# Patient Record
Sex: MALE | Race: BLACK OR AFRICAN AMERICAN | NOT HISPANIC OR LATINO | Employment: OTHER | URBAN - NONMETROPOLITAN AREA
[De-identification: names, ages, dates, MRNs, and addresses within clinical notes are randomized per-mention and may not be internally consistent; named-entity substitution may affect disease eponyms.]

---

## 2017-01-01 ENCOUNTER — LAB (OUTPATIENT)
Dept: LAB | Facility: HOSPITAL | Age: 82
End: 2017-01-01

## 2017-01-01 ENCOUNTER — OFFICE VISIT (OUTPATIENT)
Dept: FAMILY MEDICINE CLINIC | Facility: CLINIC | Age: 82
End: 2017-01-01

## 2017-01-01 ENCOUNTER — TELEPHONE (OUTPATIENT)
Dept: FAMILY MEDICINE CLINIC | Facility: CLINIC | Age: 82
End: 2017-01-01

## 2017-01-01 ENCOUNTER — OFFICE VISIT (OUTPATIENT)
Dept: CARDIOLOGY | Facility: CLINIC | Age: 82
End: 2017-01-01

## 2017-01-01 VITALS
SYSTOLIC BLOOD PRESSURE: 110 MMHG | WEIGHT: 176 LBS | HEART RATE: 68 BPM | HEIGHT: 67 IN | BODY MASS INDEX: 27.62 KG/M2 | DIASTOLIC BLOOD PRESSURE: 70 MMHG

## 2017-01-01 VITALS
HEART RATE: 65 BPM | HEIGHT: 67 IN | WEIGHT: 174.3 LBS | OXYGEN SATURATION: 98 % | SYSTOLIC BLOOD PRESSURE: 148 MMHG | BODY MASS INDEX: 27.36 KG/M2 | DIASTOLIC BLOOD PRESSURE: 65 MMHG

## 2017-01-01 DIAGNOSIS — I48.20 CHRONIC ATRIAL FIBRILLATION (HCC): ICD-10-CM

## 2017-01-01 DIAGNOSIS — I10 ESSENTIAL HYPERTENSION: ICD-10-CM

## 2017-01-01 DIAGNOSIS — R82.90 ABNORMAL URINE FINDINGS: Primary | ICD-10-CM

## 2017-01-01 DIAGNOSIS — Z00.00 MEDICARE ANNUAL WELLNESS VISIT, INITIAL: Primary | ICD-10-CM

## 2017-01-01 DIAGNOSIS — I36.1 NON-RHEUMATIC TRICUSPID VALVE INSUFFICIENCY: ICD-10-CM

## 2017-01-01 DIAGNOSIS — D64.9 ANEMIA, UNSPECIFIED TYPE: ICD-10-CM

## 2017-01-01 DIAGNOSIS — E55.9 VITAMIN D DEFICIENCY: ICD-10-CM

## 2017-01-01 DIAGNOSIS — I50.30 HEART FAILURE WITH PRESERVED EJECTION FRACTION, BORDERLINE, CLASS II (HCC): ICD-10-CM

## 2017-01-01 DIAGNOSIS — I25.10 ATHEROSCLEROSIS OF NATIVE CORONARY ARTERY OF NATIVE HEART WITHOUT ANGINA PECTORIS: ICD-10-CM

## 2017-01-01 DIAGNOSIS — Z23 NEED FOR IMMUNIZATION AGAINST INFLUENZA: ICD-10-CM

## 2017-01-01 DIAGNOSIS — R82.90 ABNORMAL URINE FINDINGS: ICD-10-CM

## 2017-01-01 DIAGNOSIS — I10 ESSENTIAL HYPERTENSION: Primary | ICD-10-CM

## 2017-01-01 LAB
25(OH)D3 SERPL-MCNC: 55.7 NG/ML (ref 30–100)
ALBUMIN SERPL-MCNC: 4.5 G/DL (ref 3.4–4.8)
ALBUMIN/GLOB SERPL: 1.2 G/DL (ref 1.1–1.8)
ALP SERPL-CCNC: 107 U/L (ref 38–126)
ALT SERPL W P-5'-P-CCNC: 24 U/L (ref 21–72)
ANION GAP SERPL CALCULATED.3IONS-SCNC: 13 MMOL/L (ref 5–15)
AST SERPL-CCNC: 38 U/L (ref 17–59)
BACTERIA SPEC AEROBE CULT: ABNORMAL
BACTERIA SPEC AEROBE CULT: ABNORMAL
BACTERIA UR QL AUTO: ABNORMAL /HPF
BASOPHILS # BLD AUTO: 0.05 10*3/MM3 (ref 0–0.2)
BASOPHILS NFR BLD AUTO: 0.6 % (ref 0–2)
BILIRUB SERPL-MCNC: 1 MG/DL (ref 0.2–1.3)
BILIRUB UR QL STRIP: NEGATIVE
BUN BLD-MCNC: 27 MG/DL (ref 7–21)
BUN/CREAT SERPL: 16 (ref 7–25)
CALCIUM SPEC-SCNC: 9.6 MG/DL (ref 8.4–10.2)
CHLORIDE SERPL-SCNC: 100 MMOL/L (ref 95–110)
CLARITY UR: CLEAR
CO2 SERPL-SCNC: 29 MMOL/L (ref 22–31)
COLOR UR: YELLOW
CREAT BLD-MCNC: 1.69 MG/DL (ref 0.7–1.3)
DEPRECATED RDW RBC AUTO: 45.7 FL (ref 35.1–43.9)
EOSINOPHIL # BLD AUTO: 1.51 10*3/MM3 (ref 0–0.7)
EOSINOPHIL NFR BLD AUTO: 17.7 % (ref 0–7)
ERYTHROCYTE [DISTWIDTH] IN BLOOD BY AUTOMATED COUNT: 13.1 % (ref 11.5–14.5)
GFR SERPL CREATININE-BSD FRML MDRD: 47 ML/MIN/1.73 (ref 42–98)
GLOBULIN UR ELPH-MCNC: 3.7 GM/DL (ref 2.3–3.5)
GLUCOSE BLD-MCNC: 102 MG/DL (ref 60–100)
GLUCOSE UR STRIP-MCNC: NEGATIVE MG/DL
HCT VFR BLD AUTO: 38.1 % (ref 39–49)
HGB BLD-MCNC: 12.6 G/DL (ref 13.7–17.3)
HGB UR QL STRIP.AUTO: NEGATIVE
HYALINE CASTS UR QL AUTO: ABNORMAL /LPF
IMM GRANULOCYTES # BLD: 0.03 10*3/MM3 (ref 0–0.02)
IMM GRANULOCYTES NFR BLD: 0.4 % (ref 0–0.5)
KETONES UR QL STRIP: NEGATIVE
LEUKOCYTE ESTERASE UR QL STRIP.AUTO: ABNORMAL
LYMPHOCYTES # BLD AUTO: 4.15 10*3/MM3 (ref 0.6–4.2)
LYMPHOCYTES NFR BLD AUTO: 48.8 % (ref 10–50)
MCH RBC QN AUTO: 31.7 PG (ref 26.5–34)
MCHC RBC AUTO-ENTMCNC: 33.1 G/DL (ref 31.5–36.3)
MCV RBC AUTO: 96 FL (ref 80–98)
MONOCYTES # BLD AUTO: 0.38 10*3/MM3 (ref 0–0.9)
MONOCYTES NFR BLD AUTO: 4.5 % (ref 0–12)
NEUTROPHILS # BLD AUTO: 2.39 10*3/MM3 (ref 2–8.6)
NEUTROPHILS NFR BLD AUTO: 28 % (ref 37–80)
NITRITE UR QL STRIP: NEGATIVE
PH UR STRIP.AUTO: 5.5 [PH] (ref 5–9)
PLATELET # BLD AUTO: 215 10*3/MM3 (ref 150–450)
PMV BLD AUTO: 10.1 FL (ref 8–12)
POTASSIUM BLD-SCNC: 4.2 MMOL/L (ref 3.5–5.1)
PROT SERPL-MCNC: 8.2 G/DL (ref 6.3–8.6)
PROT UR QL STRIP: ABNORMAL
RBC # BLD AUTO: 3.97 10*6/MM3 (ref 4.37–5.74)
RBC # UR: ABNORMAL /HPF
REF LAB TEST METHOD: ABNORMAL
SODIUM BLD-SCNC: 142 MMOL/L (ref 137–145)
SP GR UR STRIP: 1.02 (ref 1–1.03)
SQUAMOUS #/AREA URNS HPF: ABNORMAL /HPF
UROBILINOGEN UR QL STRIP: ABNORMAL
WBC NRBC COR # BLD: 8.51 10*3/MM3 (ref 3.2–9.8)
WBC UR QL AUTO: ABNORMAL /HPF

## 2017-01-01 PROCEDURE — G0438 PPPS, INITIAL VISIT: HCPCS | Performed by: GENERAL PRACTICE

## 2017-01-01 PROCEDURE — 99213 OFFICE O/P EST LOW 20 MIN: CPT | Performed by: INTERNAL MEDICINE

## 2017-01-01 PROCEDURE — 85025 COMPLETE CBC W/AUTO DIFF WBC: CPT | Performed by: GENERAL PRACTICE

## 2017-01-01 PROCEDURE — G0008 ADMIN INFLUENZA VIRUS VAC: HCPCS | Performed by: GENERAL PRACTICE

## 2017-01-01 PROCEDURE — 87086 URINE CULTURE/COLONY COUNT: CPT | Performed by: GENERAL PRACTICE

## 2017-01-01 PROCEDURE — 36415 COLL VENOUS BLD VENIPUNCTURE: CPT

## 2017-01-01 PROCEDURE — 99213 OFFICE O/P EST LOW 20 MIN: CPT | Performed by: GENERAL PRACTICE

## 2017-01-01 PROCEDURE — 82306 VITAMIN D 25 HYDROXY: CPT | Performed by: GENERAL PRACTICE

## 2017-01-01 PROCEDURE — 80053 COMPREHEN METABOLIC PANEL: CPT | Performed by: GENERAL PRACTICE

## 2017-01-01 PROCEDURE — 81001 URINALYSIS AUTO W/SCOPE: CPT | Performed by: GENERAL PRACTICE

## 2017-01-01 PROCEDURE — 90662 IIV NO PRSV INCREASED AG IM: CPT | Performed by: GENERAL PRACTICE

## 2017-01-01 RX ORDER — LOSARTAN POTASSIUM 100 MG/1
TABLET ORAL
Qty: 90 TABLET | Refills: 3 | Status: SHIPPED | OUTPATIENT
Start: 2017-01-01 | End: 2018-01-01

## 2017-01-03 ENCOUNTER — OFFICE VISIT (OUTPATIENT)
Dept: FAMILY MEDICINE CLINIC | Facility: CLINIC | Age: 82
End: 2017-01-03

## 2017-01-03 VITALS
WEIGHT: 182.2 LBS | HEIGHT: 67 IN | DIASTOLIC BLOOD PRESSURE: 80 MMHG | SYSTOLIC BLOOD PRESSURE: 135 MMHG | OXYGEN SATURATION: 97 % | HEART RATE: 63 BPM | BODY MASS INDEX: 28.6 KG/M2

## 2017-01-03 DIAGNOSIS — B35.1 ONYCHOMYCOSIS: ICD-10-CM

## 2017-01-03 DIAGNOSIS — L60.0 INGROWN NAIL: ICD-10-CM

## 2017-01-03 DIAGNOSIS — Z23 NEED FOR INFLUENZA VACCINATION: ICD-10-CM

## 2017-01-03 DIAGNOSIS — I10 ESSENTIAL HYPERTENSION: Primary | Chronic | ICD-10-CM

## 2017-01-03 DIAGNOSIS — M15.9 DEGENERATIVE JOINT DISEASE INVOLVING MULTIPLE JOINTS ON BOTH SIDES OF BODY: Chronic | ICD-10-CM

## 2017-01-03 DIAGNOSIS — D64.9 ANEMIA, UNSPECIFIED TYPE: Chronic | ICD-10-CM

## 2017-01-03 DIAGNOSIS — N28.9 RENAL IMPAIRMENT: Chronic | ICD-10-CM

## 2017-01-03 DIAGNOSIS — Z23 NEED FOR VACCINATION: ICD-10-CM

## 2017-01-03 PROCEDURE — G0008 ADMIN INFLUENZA VIRUS VAC: HCPCS | Performed by: GENERAL PRACTICE

## 2017-01-03 PROCEDURE — 99214 OFFICE O/P EST MOD 30 MIN: CPT | Performed by: GENERAL PRACTICE

## 2017-01-03 NOTE — PROGRESS NOTES
Subjective   Darian Pedroza is a 88 y.o. male.     Chief Complaint   Patient presents with   • Follow-up   • Hypertension     History of Present Illness     For review and evaluation of management of chronic medical problems. Labs reviewed.   Hypertension   This is a chronic problem. The current episode started more than 1 year ago. The problem is unchanged. The problem is controlled. Pertinent negatives include no chest pain, headaches, neck pain, palpitations or shortness of breath. There are no associated agents to hypertension. Past treatments include diuretics and angiotensin blockers. The current treatment provides significant improvement. There are no compliance problems.       2 nights ago had severe pain in right knee, felt like electrical shocks, is better now, thinks might have been weather related. Has swelling right big toe, wondering if gout. Has never had before.  Would like to see a podiatrist for his toenails as they are thick and long and cut into his toes.     The following portions of the patient's history were reviewed and updated as appropriate: allergies, current medications, past social history and problem list.      Current Outpatient Prescriptions:   •  aspirin 81 MG chewable tablet, Chew 81 mg Daily., Disp: , Rfl:   •  atorvastatin (LIPITOR) 40 MG tablet, Take 40 mg by mouth Daily., Disp: , Rfl:   •  B Complex Vitamins (VITAMIN B COMPLEX) capsule capsule, Take 1 capsule by mouth Daily., Disp: , Rfl:   •  bimatoprost (LUMIGAN) 0.01 % ophthalmic drops, 1 drop Every Night., Disp: , Rfl:   •  colchicine 0.6 MG tablet, Take 0.6 mg by mouth Daily., Disp: , Rfl:   •  cyclobenzaprine (FLEXERIL) 5 MG tablet, Take 5 mg by mouth 3 (Three) Times a Day As Needed for muscle spasms., Disp: , Rfl:   •  diltiazem XR (DILT-XR) 120 MG 24 hr capsule, Take 1 capsule by mouth daily., Disp: 90 capsule, Rfl: 2  •  dorzolamide (TRUSOPT) 2 % ophthalmic solution, 1 drop 2 (Two) Times a Day., Disp: , Rfl:   •   "ferrous sulfate 325 (65 FE) MG tablet, Take 1 tablet by mouth 2 (Two) Times a Day., Disp: 180 tablet, Rfl: 1  •  furosemide (LASIX) 40 MG tablet, Take 40 mg by mouth 2 (Two) Times a Day., Disp: , Rfl:   •  losartan (COZAAR) 100 MG tablet, Take 50 mg by mouth Daily., Disp: , Rfl:   •  spironolactone (ALDACTONE) 25 MG tablet, Take 12.5 mg by mouth Daily., Disp: , Rfl:   No current facility-administered medications for this visit.     Review of Systems   Constitutional: Negative.  Negative for activity change, appetite change, chills, fatigue, fever and unexpected weight change.   HENT: Negative.  Negative for congestion, ear pain, hearing loss, nosebleeds, rhinorrhea, sinus pressure, sneezing, sore throat, tinnitus and trouble swallowing.    Eyes: Negative.  Negative for pain, discharge, redness, itching and visual disturbance.   Respiratory: Negative.  Negative for apnea, cough, chest tightness, shortness of breath and wheezing.    Cardiovascular: Negative.  Negative for chest pain, palpitations and leg swelling.   Gastrointestinal: Negative.  Negative for abdominal distention, abdominal pain, constipation, diarrhea, nausea and vomiting.   Endocrine: Negative.    Genitourinary: Negative.  Negative for dysuria, frequency and urgency.   Musculoskeletal: Positive for arthralgias. Negative for back pain, gait problem, joint swelling, myalgias, neck pain and neck stiffness.   Skin: Negative.  Negative for color change and rash.   Allergic/Immunologic: Negative.    Neurological: Negative.  Negative for dizziness, weakness, light-headedness, numbness and headaches.   Hematological: Negative.  Negative for adenopathy.   Psychiatric/Behavioral: Negative.  Negative for dysphoric mood and sleep disturbance. The patient is not nervous/anxious.      Objective     Visit Vitals   • /80 (BP Location: Left arm, Patient Position: Sitting, Cuff Size: Adult)   • Pulse 63   • Ht 67\" (170.2 cm)   • Wt 182 lb 3.2 oz (82.6 kg)   • " SpO2 97%   • BMI 28.54 kg/m2     Physical Exam   Constitutional: He is oriented to person, place, and time. He appears well-developed and well-nourished. No distress.   HENT:   Head: Normocephalic.   Nose: Nose normal.   Mouth/Throat: Oropharynx is clear and moist.   Eyes: Conjunctivae and EOM are normal. Pupils are equal, round, and reactive to light. Right eye exhibits no discharge. Left eye exhibits no discharge.   Neck: No thyromegaly present.   Cardiovascular: Normal rate, regular rhythm, normal heart sounds and intact distal pulses.    No murmur heard.  Pulmonary/Chest: Effort normal and breath sounds normal.   Musculoskeletal: He exhibits no edema.        Right knee: He exhibits normal range of motion, no swelling, no effusion, no deformity and no erythema.       Vascular Status -  His exam exhibits right foot vasculature normal. His exam exhibits no right foot edema. His exam exhibits left foot vasculature normal. His exam exhibits no left foot edema.   Skin Integrity  -  His right foot skin is intact.   Darian's left foot skin is intact. .     Lymphadenopathy:     He has no cervical adenopathy.   Neurological: He is alert and oriented to person, place, and time.   Skin: Skin is warm and dry.   Has onychmychosis and long toenails, has trouble cutting them himself   Psychiatric: He has a normal mood and affect.   Nursing note and vitals reviewed.    Results for orders placed or performed during the hospital encounter of 12/23/16   Hemoglobin   Result Value Ref Range    Hemoglobin 12.2 (L) 13.7 - 17.3 gm/dl   Hematocrit   Result Value Ref Range    Hematocrit 35.9 (L) 39.0 - 49.0 %   Renal Function Panel   Result Value Ref Range    Sodium 142 137 - 145 mmol/L    Potassium 4.1 3.5 - 5.1 mmol/L    Chloride 99 95 - 110 mmol/L    CO2 33 (H) 22 - 31 mmol/L    Anion Gap 10.0 5.0 - 15.0 mmol/L    Glucose 93 60 - 100 mg/dl    BUN 19 7 - 21 mg/dl    Creatinine 1.5 (H) 0.7 - 1.3 mg/dl    GFR MDRD Non  44  42 - 98 mL/min/1.73 sq.M    GFR MDRD African American 53 42 - 98 mL/min/1.73 sq.M    Calcium 9.3 8.4 - 10.2 mg/dl    Phosphorus 3.2 2.4 - 4.4 mg/dl    Calcium x Phosphorus 30 0 - 55 mg2/dl2    Albumin 4.0 3.4 - 4.8 gm/dl   Vitamin D 25 Hydroxy   Result Value Ref Range    25 Hydroxy, Vitamin D 68.6 30.0 - 100.0 ng/ml   Urinalysis   Result Value Ref Range    Color, UA YELLOW STRAW,YELLOW,DK YELLOW,GUSTAVO    Appearance CLEAR CLEAR    Specific Gravity, UA 1.017 1.003 - 1.030    pH, UA 5.5 5.0 - 9.0 pH Units    Leukocytes, UA 3+ (H) NEGATIVE    Nitrite, UA NEGATIVE NEGATIVE    Protein, UA NEGATIVE NEGATIVE    Glucose, Urine NEGATIVE NEGATIVE mg/dl    Ketones, UA NEGATIVE NEGATIVE    Urobilinogen, UA 1.0 (H) 0.2 EU/dl    Blood, UA NEGATIVE NEGATIVE   Urinalysis, Microscopic Only   Result Value Ref Range    WBC, UA 21-30 (H) NONE SEEN,0-2,3-5  /HPF    RBC, UA 0-2 NONE SEEN,0-2,3-5  /HPF    Epithelial cells 0-2 NONE SEEN,0-2,3-5  /HPF    Bacteria, UA NONE SEEN NONE SEEN      Assessment/Plan     Problem List Items Addressed This Visit        Cardiovascular and Mediastinum    Hypertension - Primary       Genitourinary    Renal impairment       Hematopoietic and Hemostatic    Anemia      Other Visit Diagnoses     Degenerative joint disease involving multiple joints on both sides of body  (Chronic)       Onychomycosis        Relevant Orders    Ambulatory Referral to Podiatry    Ingrown nail        Relevant Orders    Ambulatory Referral to Podiatry    Need for influenza vaccination        Relevant Orders    Flu Vaccine PF ID (Completed)    Need for vaccination        Relevant Medications    pneumococcal conj. 13-valent (PREVNAR-13) vaccine 0.5 mL (Completed)        Continue current treatment.     New Medications Ordered This Visit   Medications   • pneumococcal conj. 13-valent (PREVNAR-13) vaccine 0.5 mL

## 2017-01-03 NOTE — PATIENT INSTRUCTIONS
Do not take Aleve, Advil, Motrin, ibuprofen, naproxen!    Only take Tylenol or acetaminophen for pain, or use arthritis cream to rub on

## 2017-01-03 NOTE — MR AVS SNAPSHOT
Darian Ba Yovany   1/3/2017 8:30 AM   Office Visit    Dept Phone:  [phone]   Encounter #:  [id number]    Provider:  Poonam Peña MD   Department:  [facility] FAMILY MEDICINE                Your Full Care Plan              Your Updated Medication List          This list is accurate as of: 1/3/17  9:21 AM.  Always use your most recent med list.                aspirin 81 MG chewable tablet       atorvastatin 40 MG tablet   Commonly known as:  LIPITOR       bimatoprost 0.01 % ophthalmic drops   Commonly known as:  LUMIGAN       colchicine 0.6 MG tablet       COZAAR 100 MG tablet   Generic drug:  losartan       cyclobenzaprine 5 MG tablet   Commonly known as:  FLEXERIL       diltiazem  MG 24 hr capsule   Commonly known as:  DILT-XR   Take 1 capsule by mouth daily.       dorzolamide 2 % ophthalmic solution   Commonly known as:  TRUSOPT       ferrous sulfate 325 (65 FE) MG tablet   Take 1 tablet by mouth 2 (Two) Times a Day.       furosemide 40 MG tablet   Commonly known as:  LASIX       spironolactone 25 MG tablet   Commonly known as:  ALDACTONE       vitamin b complex capsule capsule               You Were Diagnosed With        Codes Comments    Essential hypertension    -  Primary ICD-10-CM: I10  ICD-9-CM: 401.9     Degenerative joint disease involving multiple joints on both sides of body     ICD-10-CM: M15.9  ICD-9-CM: 715.89     Renal impairment     ICD-10-CM: N28.9  ICD-9-CM: 593.9     Anemia, unspecified type     ICD-10-CM: D64.9  ICD-9-CM: 285.9       Instructions    Do not take Aleve, Advil, Motrin, ibuprofen, naproxen!    Only take Tylenol or acetaminophen for pain, or use arthritis cream to rub on     Patient Instructions History      Upcoming Appointments     Visit Type Date Time Department    OFFICE VISIT 1/3/2017  8:30 AM [facility] FAM MED [facility] 4TH    PACEMAKER CHECK 3/6/2017  9:00 AM [facility] HEART CARE [facility]    OFFICE VISIT 4/21/2017  8:30 AM [facility] HEART CARE  "[facility]    OFFICE VISIT 7/3/2017  8:00 AM [facility] FAM MED [facility] 4TH      MyChart Signup     Our records indicate that you have declined [facility] signup. If you would like to sign up for [employer], please email [email or web address] or call [phone] to obtain an activation code.             Other Info from Your Visit           Your Appointments     Mar 06, 2017  9:00 AM CST   PACEMAKER CHECK with PACER CLINIC HEART CARE [facility] CARDIOLOGY (--)    [address]  [facility] [address] [phone]            Apr 21, 2017  8:30 AM CDT   Office Visit with Adam Martin MD   [facility] CARDIOLOGY (--)    [address]  [facility] [address] [phone]           Arrive 15 minutes prior to appointment.            Jul 03, 2017  8:00 AM CDT   Office Visit with Poonam Peña MD   [facility] FAMILY MEDICINE (--)    [address]  [facility] [address] [phone]           Arrive 15 minutes prior to appointment.              Allergies     No Known Allergies      Reason for Visit     Follow-up     Hypertension           Vital Signs     Blood Pressure Pulse Height Weight Oxygen Saturation Body Mass Index    135/80 (BP Location: Left arm, Patient Position: Sitting, Cuff Size: Adult) 63 67\" (170.2 cm) 182 lb 3.2 oz (82.6 kg) 97% 28.54 kg/m2    Smoking Status                   Former Smoker           Problems and Diagnoses Noted     Anemia    High blood pressure    High blood pressure    Poor kidney function    Vitamin D deficiency    Degenerative joint disease involving multiple joints on both sides of body            "

## 2017-01-04 ENCOUNTER — OFFICE VISIT (OUTPATIENT)
Dept: PODIATRY | Facility: CLINIC | Age: 82
End: 2017-01-04

## 2017-01-04 VITALS — BODY MASS INDEX: 29.03 KG/M2 | WEIGHT: 185 LBS | HEIGHT: 67 IN

## 2017-01-04 DIAGNOSIS — M79.675 CHRONIC PAIN OF TOE OF LEFT FOOT: ICD-10-CM

## 2017-01-04 DIAGNOSIS — M79.672 FOOT PAIN, BILATERAL: Primary | ICD-10-CM

## 2017-01-04 DIAGNOSIS — M79.674 CHRONIC PAIN OF TOE OF RIGHT FOOT: ICD-10-CM

## 2017-01-04 DIAGNOSIS — G89.29 CHRONIC PAIN OF TOE OF RIGHT FOOT: ICD-10-CM

## 2017-01-04 DIAGNOSIS — B35.1 ONYCHOMYCOSIS: ICD-10-CM

## 2017-01-04 DIAGNOSIS — M79.671 FOOT PAIN, BILATERAL: Primary | ICD-10-CM

## 2017-01-04 DIAGNOSIS — G89.29 CHRONIC PAIN OF TOE OF LEFT FOOT: ICD-10-CM

## 2017-01-04 PROCEDURE — 11721 DEBRIDE NAIL 6 OR MORE: CPT | Performed by: PODIATRIST

## 2017-01-04 PROCEDURE — 99203 OFFICE O/P NEW LOW 30 MIN: CPT | Performed by: PODIATRIST

## 2017-01-04 NOTE — LETTER
January 5, 2017     Poonam Peña MD  [address] [place] [address]    Patient: Darian Pedroza   YOB: 1928   Date of Visit: 1/4/2017       Dear Dr. Mariana MD:    Thank you for referring Darian Pedroza to me for evaluation. Below is a copy of my consult note.    If you have questions, please do not hesitate to call me. I look forward to following Darian along with you.         Sincerely,        Jamar Garces DPM        CC: No Recipients    Darian Pedroza  5/27/1928  88 y.o. male   PCP: Dr. Peña last seen 1/3/2016.  Patient presents today for nail care.    1/5/2017  Chief Complaint   Patient presents with   • Left Foot - toe nail removal   • Right Foot - toe nail removal           History of Present Illness    Patient presents to clinic today with chief complaint of painful nails on both feet.  States they have become long and painful especially when walking.  Rates the pain as a 6 out of 10.  He is not able to trim them with the nail clippers that he has.  Relates that he had the big toenails removed some time ago and he would like to same procedure done to the remainder of his nails.  He describes the pain as dull and achy.  He denies any trauma to his feet.  He has no other pedal complaints.         Past Medical History   Diagnosis Date   • Abrasion of lower limb    • Acute gastritis    • Allergic rhinitis    • Anal pain    • Anemia       chronic GI loss and renal failure      • Anemia      due to blood loss   • Atrophic gastritis    • Benign prostatic hyperplasia    • Bronchopneumonia    • CAD (coronary artery disease)    • Cellulitis and abscess of leg    • CHF (congestive heart failure)    • Chronic allergic conjunctivitis    • Chronic atrial fibrillation    • COPD (chronic obstructive pulmonary disease)    • Coronary arteriosclerosis    • Coronary atherosclerosis of native coronary artery    • Cortical senile cataract    • Cough    • Diverticulitis of colon    •  Dyspnea    • Edema      multifactorial   • Edema of leg    • Essential hypertension    • External hemorrhoids without complication    • Fever    • Flank pain    • Glaucoma      open angle   • Heart failure    • Hemorrhoids    • History of colon polyps    • Hypermetropia    • Hypertension    • Idiopathic gout      left ankle and foot   • Inguinal pain       probable injury to scar tissue related to penile implant      • Nasal congestion    • Nuclear cataract    • Occult blood in stools    • Onychomycosis    • Primary open angle glaucoma      advanced, progression of VF with IOP in mid teens for past 2+ years; on max tolerated meds      • Rectal hemorrhage    • Renal impairment    • Screening for malignant neoplasm of prostate    • URI (upper respiratory infection)    • UTI (urinary tract infection)    • Vitamin D deficiency          Past Surgical History   Procedure Laterality Date   • Replacement total knee     • Pacemaker implantation  03/2011   • Other surgical history  1988     Anesth, insert penis device    • Coronary artery bypass graft  1997     x 5, Dr JOSE Centeno   • Cardiac catheterization  11/05/2002     [facility]. Jasper Carey M.D. LV w/ moderate hypokinesis. EF 50%. CAD w/ total occlusion CX & RCA high grade stenosis 1st OM. LIMA to LAD is patent, Vein graft to PDA, graft to diagonal branch, distal 2 branches CX.   • Endoscopy and colonoscopy  06/03/2013     Internal & external hemorrhoids found   • Endoscopy  05/15/2013     with tube-Normal esophagus. Gastritis in stomach. Biospy taken. Normal duodenum   • Injection of medication  01/29/2016     Kenalog   • Eye surgery  03/18/2013   • Anal fissurectomy  03/05/1970     Fissurectomy & sphincterectomy. Anal fissure   • Kidney stone surgery  09/10/1998     Dr Johnston   • Mouth surgery  1987     Lower gum build up Dr Juanito Prasad, D.D.S.   • Replacement total knee  12/28/2005     Right, done by Dr Jaqui Olvera  "History   Problem Relation Age of Onset   • Hypertension Other    • Heart disease Other          Social History     Social History   • Marital status:      Spouse name: N/A   • Number of children: N/A   • Years of education: N/A     Occupational History   • Not on file.     Social History Main Topics   • Smoking status: Former Smoker   • Smokeless tobacco: Never Used   • Alcohol use No   • Drug use: Not on file   • Sexual activity: Not on file     Other Topics Concern   • Not on file     Social History Narrative         Current Outpatient Prescriptions   Medication Sig Dispense Refill   • aspirin 81 MG chewable tablet Chew 81 mg Daily.     • atorvastatin (LIPITOR) 40 MG tablet Take 40 mg by mouth Daily.     • B Complex Vitamins (VITAMIN B COMPLEX) capsule capsule Take 1 capsule by mouth Daily.     • bimatoprost (LUMIGAN) 0.01 % ophthalmic drops 1 drop Every Night.     • colchicine 0.6 MG tablet Take 0.6 mg by mouth Daily.     • cyclobenzaprine (FLEXERIL) 5 MG tablet Take 5 mg by mouth 3 (Three) Times a Day As Needed for muscle spasms.     • diltiazem XR (DILT-XR) 120 MG 24 hr capsule Take 1 capsule by mouth daily. 90 capsule 2   • dorzolamide (TRUSOPT) 2 % ophthalmic solution 1 drop 2 (Two) Times a Day.     • ferrous sulfate 325 (65 FE) MG tablet Take 1 tablet by mouth 2 (Two) Times a Day. 180 tablet 1   • furosemide (LASIX) 40 MG tablet Take 40 mg by mouth 2 (Two) Times a Day.     • losartan (COZAAR) 100 MG tablet Take 50 mg by mouth Daily.     • spironolactone (ALDACTONE) 25 MG tablet Take 12.5 mg by mouth Daily.       No current facility-administered medications for this visit.          OBJECTIVE    Visit Vitals   • Ht 67\" (170.2 cm)   • Wt 185 lb (83.9 kg)   • BMI 28.98 kg/m2         Review of Systems   Constitutional: Negative for chills and fever.   Cardiovascular: Negative for chest pain.   Gastrointestinal: Negative for constipation, diarrhea, nausea and vomiting.   Skin: Negative for wound.  long " painful toenails  Musculoskeletal: foot pain      Constitutional: well developed, well nourished    HEENT: Normocephalic and atraumatic, normal hearing    Respiratory: Non labored respirations noted    Cardiovascular:    DP/PT pulses palpable   CFT brisk  to all digits  Skin temp is warm to warm from proximal tibia to distal digits  Pedal hair growth absent.   No erythema or edema noted     Musculoskeletal:  Muscle strength is 5/5 for all muscle groups tested   ROM of the 1st MTP is decreased without pain or crepitus  ROM of the MTJ is full without pain or crepitus    ROM of the STJ is full without pain or crepitus    ROM of the ankle joint is decreased without pain or crepitus    Pain on palpation to the toenails bilateral  Rectus foot type     Dermatological:   Nails 2-5 bilateral are thickened, elongated, discolored with subungual debris.  Absent hallux nails noted bilateral   Skin is warm, dry and intact    Webspaces 1-4 bilateral are clean, dry and intact.   No subcutaneous nodules or masses noted    No open wounds noted     Neurological:   Protective sensation intact    Sensation intact to light touch    DTR intact    Psychiatric: A&O x 3 with normal mood and affect. NAD.           Procedures        ASSESSMENT AND PLAN    Darian was seen today for toe nail removal and toe nail removal.    Diagnoses and all orders for this visit:    Foot pain, bilateral    Onychomycosis    Chronic pain of toe of left foot    Chronic pain of toe of right foot    Discussion held patient regarding the treatment of fungal nails.  At this time patient declines nail biopsy and possible treatment with oral Lamisil.  Nails 1 through 5 bilateral were debrided in length and thickness without incident utilizing nail nippers.  Recommend the patient obtain sturdier nail nippers from [facility].  Patient is in agreement with the current treatment plan.  All his questions were answered to his satisfaction.  Return to clinic as  needed.            This document has been electronically signed by Jamar Garces DPM on January 5, 2017 2:44 PM     1/5/2017  2:44 PM

## 2017-01-04 NOTE — MR AVS SNAPSHOT
Darian Pedroza   1/4/2017 10:00 AM   Office Visit    Dept Phone:  [phone]   Encounter #:  [id number]    Provider:  Jamar Garces DPM   Department:  [facility] PODIATRY                Your Full Care Plan              Your Updated Medication List          This list is accurate as of: 1/4/17 10:19 AM.  Always use your most recent med list.                aspirin 81 MG chewable tablet       atorvastatin 40 MG tablet   Commonly known as:  LIPITOR       bimatoprost 0.01 % ophthalmic drops   Commonly known as:  LUMIGAN       colchicine 0.6 MG tablet       COZAAR 100 MG tablet   Generic drug:  losartan       cyclobenzaprine 5 MG tablet   Commonly known as:  FLEXERIL       diltiazem  MG 24 hr capsule   Commonly known as:  DILT-XR   Take 1 capsule by mouth daily.       dorzolamide 2 % ophthalmic solution   Commonly known as:  TRUSOPT       ferrous sulfate 325 (65 FE) MG tablet   Take 1 tablet by mouth 2 (Two) Times a Day.       furosemide 40 MG tablet   Commonly known as:  LASIX       spironolactone 25 MG tablet   Commonly known as:  ALDACTONE       vitamin b complex capsule capsule               Medications to be Given to You by a Medical Professional     Due       Frequency    (none) pneumococcal conj. 13-valent (PREVNAR-13) vaccine 0.5 mL  Once      Instructions     None    Patient Instructions History      Upcoming Appointments     Visit Type Date Time Department    NEW PATIENT 1/4/2017 10:00 AM [facility]    PACEMAKER CHECK 3/6/2017  9:00 AM [facility]    OFFICE VISIT 4/21/2017  8:30 AM [facility]    OFFICE VISIT 7/3/2017  8:00 AM [facility] MED [facility] 4TH      MyChart Signup     Our records indicate that you have declined [facility] signup. If you would like to sign up for [facility], please email [email or web address] or call [phone] to obtain an activation code.             Other Info from Your Visit           Your  "Appointments     Mar 06, 2017  9:00 AM CST   PACEMAKER CHECK with PACER CLINIC HEART CARE [facility] CARDIOLOGY (--)    [address]  [facility] [address] [phone]            Apr 21, 2017  8:30 AM CDT   Office Visit with Adam Martin MD   [facility] CARDIOLOGY (--)    [address]  [facility] [address] [phone]           Arrive 15 minutes prior to appointment.            Jul 03, 2017  8:00 AM CDT   Office Visit with Poonam Peña MD   [facility] FAMILY MEDICINE (--)    [address]  [address] [place] [address] [phone]           Arrive 15 minutes prior to appointment.              Allergies     No Known Allergies      Reason for Visit     Left Foot - toe nail removal     Right Foot - toe nail removal           Vital Signs     Height Weight Body Mass Index Smoking Status          67\" (170.2 cm) 185 lb (83.9 kg) 28.98 kg/m2 Former Smoker          "

## 2017-01-04 NOTE — PROGRESS NOTES
Darian Pedroza  5/27/1928  88 y.o. male   PCP: Dr. Peña last seen 1/3/2016.  Patient presents today for nail care.    1/5/2017  Chief Complaint   Patient presents with   • Left Foot - toe nail removal   • Right Foot - toe nail removal           History of Present Illness    Patient presents to clinic today with chief complaint of painful nails on both feet.  States they have become long and painful especially when walking.  Rates the pain as a 6 out of 10.  He is not able to trim them with the nail clippers that he has.  Relates that he had the big toenails removed some time ago and he would like to same procedure done to the remainder of his nails.  He describes the pain as dull and achy.  He denies any trauma to his feet.  He has no other pedal complaints.         Past Medical History   Diagnosis Date   • Abrasion of lower limb    • Acute gastritis    • Allergic rhinitis    • Anal pain    • Anemia       chronic GI loss and renal failure      • Anemia      due to blood loss   • Atrophic gastritis    • Benign prostatic hyperplasia    • Bronchopneumonia    • CAD (coronary artery disease)    • Cellulitis and abscess of leg    • CHF (congestive heart failure)    • Chronic allergic conjunctivitis    • Chronic atrial fibrillation    • COPD (chronic obstructive pulmonary disease)    • Coronary arteriosclerosis    • Coronary atherosclerosis of native coronary artery    • Cortical senile cataract    • Cough    • Diverticulitis of colon    • Dyspnea    • Edema      multifactorial   • Edema of leg    • Essential hypertension    • External hemorrhoids without complication    • Fever    • Flank pain    • Glaucoma      open angle   • Heart failure    • Hemorrhoids    • History of colon polyps    • Hypermetropia    • Hypertension    • Idiopathic gout      left ankle and foot   • Inguinal pain       probable injury to scar tissue related to penile implant      • Nasal congestion    • Nuclear cataract    • Occult blood in  stools    • Onychomycosis    • Primary open angle glaucoma      advanced, progression of VF with IOP in mid teens for past 2+ years; on max tolerated meds      • Rectal hemorrhage    • Renal impairment    • Screening for malignant neoplasm of prostate    • URI (upper respiratory infection)    • UTI (urinary tract infection)    • Vitamin D deficiency          Past Surgical History   Procedure Laterality Date   • Replacement total knee     • Pacemaker implantation  03/2011   • Other surgical history  1988     Anesth, insert penis device    • Coronary artery bypass graft  1997     x 5, Dr JOSE Centeno   • Cardiac catheterization  11/05/2002     [facility]. Jasper Carey M.D. LV w/ moderate hypokinesis. EF 50%. CAD w/ total occlusion CX & RCA high grade stenosis 1st OM. LIMA to LAD is patent, Vein graft to PDA, graft to diagonal branch, distal 2 branches CX.   • Endoscopy and colonoscopy  06/03/2013     Internal & external hemorrhoids found   • Endoscopy  05/15/2013     with tube-Normal esophagus. Gastritis in stomach. Biospy taken. Normal duodenum   • Injection of medication  01/29/2016     Kenalog   • Eye surgery  03/18/2013   • Anal fissurectomy  03/05/1970     Fissurectomy & sphincterectomy. Anal fissure   • Kidney stone surgery  09/10/1998     Dr Johnston   • Mouth surgery  1987     Lower gum build up Dr Juanito Prasad, D.D.S.   • Replacement total knee  12/28/2005     Right, done by Dr Nails         Family History   Problem Relation Age of Onset   • Hypertension Other    • Heart disease Other          Social History     Social History   • Marital status:      Spouse name: N/A   • Number of children: N/A   • Years of education: N/A     Occupational History   • Not on file.     Social History Main Topics   • Smoking status: Former Smoker   • Smokeless tobacco: Never Used   • Alcohol use No   • Drug use: Not on file   • Sexual activity: Not on file     Other Topics Concern  "  • Not on file     Social History Narrative         Current Outpatient Prescriptions   Medication Sig Dispense Refill   • aspirin 81 MG chewable tablet Chew 81 mg Daily.     • atorvastatin (LIPITOR) 40 MG tablet Take 40 mg by mouth Daily.     • B Complex Vitamins (VITAMIN B COMPLEX) capsule capsule Take 1 capsule by mouth Daily.     • bimatoprost (LUMIGAN) 0.01 % ophthalmic drops 1 drop Every Night.     • colchicine 0.6 MG tablet Take 0.6 mg by mouth Daily.     • cyclobenzaprine (FLEXERIL) 5 MG tablet Take 5 mg by mouth 3 (Three) Times a Day As Needed for muscle spasms.     • diltiazem XR (DILT-XR) 120 MG 24 hr capsule Take 1 capsule by mouth daily. 90 capsule 2   • dorzolamide (TRUSOPT) 2 % ophthalmic solution 1 drop 2 (Two) Times a Day.     • ferrous sulfate 325 (65 FE) MG tablet Take 1 tablet by mouth 2 (Two) Times a Day. 180 tablet 1   • furosemide (LASIX) 40 MG tablet Take 40 mg by mouth 2 (Two) Times a Day.     • losartan (COZAAR) 100 MG tablet Take 50 mg by mouth Daily.     • spironolactone (ALDACTONE) 25 MG tablet Take 12.5 mg by mouth Daily.       No current facility-administered medications for this visit.          OBJECTIVE    Visit Vitals   • Ht 67\" (170.2 cm)   • Wt 185 lb (83.9 kg)   • BMI 28.98 kg/m2         Review of Systems   Constitutional: Negative for chills and fever.   Cardiovascular: Negative for chest pain.   Gastrointestinal: Negative for constipation, diarrhea, nausea and vomiting.   Skin: Negative for wound.  long painful toenails  Musculoskeletal: foot pain      Constitutional: well developed, well nourished    HEENT: Normocephalic and atraumatic, normal hearing    Respiratory: Non labored respirations noted    Cardiovascular:    DP/PT pulses palpable   CFT brisk  to all digits  Skin temp is warm to warm from proximal tibia to distal digits  Pedal hair growth absent.   No erythema or edema noted     Musculoskeletal:  Muscle strength is 5/5 for all muscle groups tested   ROM of the 1st " MTP is decreased without pain or crepitus  ROM of the MTJ is full without pain or crepitus    ROM of the STJ is full without pain or crepitus    ROM of the ankle joint is decreased without pain or crepitus    Pain on palpation to the toenails bilateral  Rectus foot type     Dermatological:   Nails 2-5 bilateral are thickened, elongated, discolored with subungual debris.  Absent hallux nails noted bilateral   Skin is warm, dry and intact    Webspaces 1-4 bilateral are clean, dry and intact.   No subcutaneous nodules or masses noted    No open wounds noted     Neurological:   Protective sensation intact    Sensation intact to light touch    DTR intact    Psychiatric: A&O x 3 with normal mood and affect. NAD.           Procedures        ASSESSMENT AND PLAN    Darian was seen today for toe nail removal and toe nail removal.    Diagnoses and all orders for this visit:    Foot pain, bilateral    Onychomycosis    Chronic pain of toe of left foot    Chronic pain of toe of right foot    Discussion held patient regarding the treatment of fungal nails.  At this time patient declines nail biopsy and possible treatment with oral Lamisil.  Nails 1 through 5 bilateral were debrided in length and thickness without incident utilizing nail nippers.  Recommend the patient [facility]  Patient is in agreement with the current treatment plan.  All his questions were answered to his satisfaction.  Return to clinic as needed.            This document has been electronically signed by Jamar Garces DPM on January 5, 2017 2:44 PM     1/5/2017  2:44 PM

## 2017-03-08 ENCOUNTER — CLINICAL SUPPORT (OUTPATIENT)
Dept: CARDIOLOGY | Facility: CLINIC | Age: 82
End: 2017-03-08

## 2017-03-08 DIAGNOSIS — I48.20 CHRONIC ATRIAL FIBRILLATION (HCC): ICD-10-CM

## 2017-03-08 PROCEDURE — 93288 INTERROG EVL PM/LDLS PM IP: CPT | Performed by: INTERNAL MEDICINE

## 2017-03-08 NOTE — PROGRESS NOTES
Mr. Pedroza is an 88-year-old male with chronic atrial fibrillation/sick sinus syndrome status post pacemaker implantation on 3/28/2011.  The battery status was adequate at 2.95 V.  The patient has a [employer] model Accent SR RF 1210 pacemaker.  The patient was ventricular paced 85% of the time.  The patient wasin the VVIR mode at a lower rate of 60 bpm.  Ventricular lead sensing was 7.6 mV and threshold 0.87V at 0.5 ms.  Impedance was 280 ohms.  Normally functioning device no changes made.

## 2017-03-14 RX ORDER — SPIRONOLACTONE 25 MG/1
TABLET ORAL
Qty: 45 TABLET | Refills: 3 | Status: SHIPPED | OUTPATIENT
Start: 2017-03-14 | End: 2018-01-01 | Stop reason: SDUPTHER

## 2017-03-14 RX ORDER — ERGOCALCIFEROL 1.25 MG/1
CAPSULE ORAL
Qty: 12 CAPSULE | Refills: 3 | Status: SHIPPED | OUTPATIENT
Start: 2017-03-14 | End: 2018-01-01 | Stop reason: SDUPTHER

## 2017-03-28 ENCOUNTER — TELEPHONE (OUTPATIENT)
Dept: FAMILY MEDICINE CLINIC | Facility: CLINIC | Age: 82
End: 2017-03-28

## 2017-03-28 RX ORDER — FERROUS SULFATE 325(65) MG
325 TABLET ORAL 2 TIMES DAILY
Qty: 180 TABLET | Refills: 2 | Status: SHIPPED | OUTPATIENT
Start: 2017-03-28 | End: 2018-01-01 | Stop reason: SDUPTHER

## 2017-03-28 NOTE — TELEPHONE ENCOUNTER
-Requested Refills Sent    ---- Message from Adina Gee sent at 3/28/2017 10:14 AM CDT -----  Contact: [phone]  JAMES PT-HE NEEDS PRES FOR FERROUS SULFATE 325MG.- QTY  . HE SAID TO MAKE SURE ABOUT THE QUANTITY-USES [facility] IN [place] AND CAN BE REACHED -[phone] IF ANY QUESTIONS.

## 2017-04-21 ENCOUNTER — OFFICE VISIT (OUTPATIENT)
Dept: CARDIOLOGY | Facility: CLINIC | Age: 82
End: 2017-04-21

## 2017-04-21 VITALS
SYSTOLIC BLOOD PRESSURE: 140 MMHG | HEIGHT: 67 IN | BODY MASS INDEX: 26.21 KG/M2 | HEART RATE: 84 BPM | WEIGHT: 167 LBS | DIASTOLIC BLOOD PRESSURE: 60 MMHG

## 2017-04-21 DIAGNOSIS — I50.30 HEART FAILURE WITH PRESERVED EJECTION FRACTION, BORDERLINE, CLASS II (HCC): ICD-10-CM

## 2017-04-21 DIAGNOSIS — I36.1 NON-RHEUMATIC TRICUSPID VALVE INSUFFICIENCY: ICD-10-CM

## 2017-04-21 DIAGNOSIS — I48.20 CHRONIC ATRIAL FIBRILLATION (HCC): ICD-10-CM

## 2017-04-21 DIAGNOSIS — I25.10 ATHEROSCLEROSIS OF NATIVE CORONARY ARTERY OF NATIVE HEART WITHOUT ANGINA PECTORIS: ICD-10-CM

## 2017-04-21 DIAGNOSIS — I10 ESSENTIAL HYPERTENSION: Primary | ICD-10-CM

## 2017-04-21 PROCEDURE — 99213 OFFICE O/P EST LOW 20 MIN: CPT | Performed by: INTERNAL MEDICINE

## 2017-04-21 RX ORDER — METOLAZONE 2.5 MG/1
2.5 TABLET ORAL DAILY
COMMUNITY
End: 2017-07-03

## 2017-04-21 RX ORDER — AMLODIPINE BESYLATE 5 MG/1
5 TABLET ORAL DAILY
COMMUNITY
End: 2017-07-03

## 2017-04-21 NOTE — PROGRESS NOTES
Darian Pedroza  88 y.o. male    04/21/2017  1. Essential hypertension    2. Atherosclerosis of native coronary artery of native heart without angina pectoris    3. Chronic atrial fibrillation    4. Non-rheumatic tricuspid valve insufficiency    5. 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  Had a strong history of GI bleed not a good candidate for long-term oral anti-conditions.  The last echocardiographic study August 2014 with normal left ventricle systolic function moderately biatrial enlargement and mild to moderate tricuspid and mild mitral regurgitation.  He had mild shortness of breath on the basis of diastolic dysfunction.  Patient denies orthopnea PND, nauseous, vomiting, diarrhea.  Denies any polydipsia polyuria.  Denies any intermittent claudication.            SUBJECTIVE    No Known Allergies      Past Medical History:   Diagnosis Date   • Abrasion of lower limb    • Acute gastritis    • Allergic rhinitis    • Anal pain    • Anemia      chronic GI loss and renal failure      • Anemia     due to blood loss   • Atrophic gastritis    • Benign prostatic hyperplasia    • Bronchopneumonia    • CAD (coronary artery disease)    • Cellulitis and abscess of leg    • CHF (congestive heart failure)    • Chronic allergic conjunctivitis    • Chronic atrial fibrillation    • COPD (chronic obstructive pulmonary disease)    • Coronary arteriosclerosis    • Coronary atherosclerosis of native coronary artery    • Cortical senile cataract    • Cough    • Diverticulitis of colon    • Dyspnea    • Edema      multifactorial   • Edema of leg    • Essential hypertension    • External hemorrhoids without complication    • Fever    • Flank pain    • Glaucoma     open angle   • Heart failure    • Hemorrhoids    • History of colon polyps    • Hypermetropia    • Hypertension    • Idiopathic gout     left ankle and foot   • Inguinal pain      probable injury to scar tissue related to penile implant      • Myocardial infarction    • Nasal congestion    • Nuclear cataract    • Occult blood in stools    • Onychomycosis    • Primary open angle glaucoma     advanced, progression of VF with IOP in mid teens for past 2+ years; on max tolerated meds      • Rectal hemorrhage    • Renal impairment    • Screening for malignant neoplasm of prostate    • URI (upper respiratory infection)    • UTI (urinary tract infection)    • Vitamin D deficiency          Past Surgical History:   Procedure Laterality Date   • ANAL FISSURECTOMY  03/05/1970    Fissurectomy & sphincterectomy. Anal fissure   • CARDIAC CATHETERIZATION  11/05/2002    [facility]. Jasper Carey M.D. LV w/ moderate hypokinesis. EF 50%. CAD w/ total occlusion CX & RCA high grade stenosis 1st OM. LIMA to LAD is patent, Vein graft to PDA, graft to diagonal branch, distal 2 branches CX.   • CORONARY ARTERY BYPASS GRAFT  1997    x 5, Dr JOSE Centeno   • ENDOSCOPY  05/15/2013    with tube-Normal esophagus. Gastritis in stomach. Biospy taken. Normal duodenum   • ENDOSCOPY AND COLONOSCOPY  06/03/2013    Internal & external hemorrhoids found   • EYE SURGERY  03/18/2013   • INJECTION OF MEDICATION  01/29/2016    Kenalog   • KIDNEY STONE SURGERY  09/10/1998    Dr Johnston   • MOUTH SURGERY  1987    Lower gum build up Dr Juanito Prasad, D.D.S.   • OTHER SURGICAL HISTORY  1988    Anesth, insert penis device    • PACEMAKER IMPLANTATION  03/2011   • REPLACEMENT TOTAL KNEE     • REPLACEMENT TOTAL KNEE  12/28/2005    Right, done by Dr Jaqui Olvera  "History   Problem Relation Age of Onset   • Hypertension Other    • Heart disease Other          Social History     Social History   • Marital status:      Spouse name: N/A   • Number of children: N/A   • Years of education: N/A     Occupational History   • Not on file.     Social History Main Topics   • Smoking status: Former Smoker   • Smokeless tobacco: Never Used   • Alcohol use No   • Drug use: No   • Sexual activity: Defer     Other Topics Concern   • Not on file     Social History Narrative         Current Outpatient Prescriptions   Medication Sig Dispense Refill   • amLODIPine (NORVASC) 5 MG tablet Take 5 mg by mouth Daily.     • aspirin 81 MG chewable tablet Chew 81 mg Daily.     • atorvastatin (LIPITOR) 40 MG tablet Take 40 mg by mouth Daily.     • B Complex Vitamins (VITAMIN B COMPLEX) capsule capsule Take 1 capsule by mouth Daily.     • bimatoprost (LUMIGAN) 0.01 % ophthalmic drops 1 drop Every Night.     • colchicine 0.6 MG tablet Take 0.6 mg by mouth Daily.     • diltiazem XR (DILT-XR) 120 MG 24 hr capsule Take 1 capsule by mouth daily. 90 capsule 2   • dorzolamide (TRUSOPT) 2 % ophthalmic solution 1 drop 2 (Two) Times a Day.     • ferrous sulfate 325 (65 FE) MG tablet Take 1 tablet by mouth 2 (Two) Times a Day. 180 tablet 2   • furosemide (LASIX) 40 MG tablet Take 40 mg by mouth Daily.     • losartan (COZAAR) 100 MG tablet Take 100 mg by mouth 2 (Two) Times a Day.     • metOLazone (ZAROXOLYN) 2.5 MG tablet Take 2.5 mg by mouth Daily.     • spironolactone (ALDACTONE) 25 MG tablet TAKE 1/2 TABLET DAILY 45 tablet 3   • vitamin D (ERGOCALCIFEROL) 47132 UNITS capsule capsule TAKE 1 CAPSULE ONCE WEEKLY 12 capsule 3     No current facility-administered medications for this visit.          OBJECTIVE    /60  Pulse 84  Ht 67\" (170.2 cm)  Wt 167 lb (75.8 kg)  BMI 26.16 kg/m2        Review of Systems     Constitutional:  Denies recent weight loss, weight gain, fever or chills, no change in " exercise tolerance     HENT:  Denies any hearing loss, epistaxis, hoarseness, or difficulty speaking.     Eyes: Wears eyeglasses or contact lenses     Respiratory:  Denies dyspnea with exertion,no cough, wheezing, or hemoptysis.     Cardiovascular: See H&P     Gastrointestinal:  Denies change in bowel habits, dyspepsia, ulcer disease, hematochezia, or melena.     Endocrine: Negative for cold intolerance, heat intolerance, polydipsia, polyphagia and polyuria. Denies any history of weight change, heat/cold intolerance, polydipsia, polyuria     Genitourinary: Negative.      Musculoskeletal: history of arthritic symptoms or back problems     Skin:  Denies any change in hair or nails, rashes, or skin lesions.     Allergic/Immunologic: Negative.  Negative for environmental allergies, food allergies and immunocompromised state.     Neurological:  Denies any history of recurrent headaches, strokes, TIA, or seizure disorder.     Hematological: Denies any food allergies, seasonal allergies, bleeding disorders, or lymphadenopathy.     Psychiatric/Behavioral: Denies any history of depression, substance abuse, or change in cognitive function.         Physical Exam     Constitutional: Cooperative, alert and oriented, well-developed, well-nourished, in no acute distress.     HENT:   Head: Normocephalic, normal hair patterns, no masses or tenderness.  Ears, Nose, and Throat: No gross abnormalities. No pallor or cyanosis. Dentition good.   Eyes: EOMS intact, PERRL, conjunctivae and lids unremarkable. Fundoscopic exam and visual fields not performed.   Neck: No palpable masses or adenopathy, no thyromegaly, no JVD, carotid pulses are full and equal bilaterally and without  Bruits.     Cardiovascular: Regular rhythm, S1 and S2 normal, no S3 or S4. Apical impulse not displaced. No murmurs, gallops, or rubs detected.     Pulmonary/Chest: Chest: normal symmetry, no tenderness to palpation, normal respiratory excursion, no intercostal  retraction, no use of accessory muscles.            Pulmonary: Normal breath sounds. No rales or ronchi.    Abdominal: Abdomen soft, bowel sounds normoactive, no masses, no hepatosplenomegaly, non-tender, no bruits.     Musculoskeletal: No deformities, clubbing, cyanosis, erythema, or edema observed. There are no spinal abnormalities noted. Normal muscle strength and tone. Pulses full and equal in all extremities, no bruits auscultated.     Neurological: No gross motor or sensory deficits noted, affect appropriate, oriented to time, person, place.     Skin: Warm and dry to the touch, no apparent skin lesions or masses noted.     Psychiatric: He has a normal mood and affect. His behavior is normal. Judgment and thought content normal.         Procedures      Lab Results   Component Value Date    WBC 9.5 04/01/2016    HGB 12.2 (L) 12/23/2016    HCT 35.9 (L) 12/23/2016    MCV 96.4 04/01/2016     04/01/2016     Lab Results   Component Value Date    GLUCOSE 93 12/23/2016    BUN 19 12/23/2016    CREATININE 1.5 (H) 12/23/2016    CO2 33 (H) 12/23/2016    CALCIUM 9.3 12/23/2016    ALBUMIN 4.0 12/23/2016    AST 19 09/30/2015    ALT 25 09/30/2015     No results found for: CHOL  No results found for: TRIG  No results found for: HDL  No results found for: LDLCALC  No results found for: LDL  No results found for: HDLLDLRATIO  No components found for: CHOLHDL  No results found for: HGBA1C  Lab Results   Component Value Date    TSH 1.16 05/16/2014           ASSESSMENT AND PLAN    #1 CAD status post CABG #2 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  No  evidence evidence of any active ischemia.  The patient's is still physically very active and appropriate his 8 with good mental status.  The patient will continue losartan for hypertension with good blood pressure control.  Zaroxolyn   as needed along with continuation of spironolactone.  Diltiazem as a part of AV jacquelyn blocking drug and atorvastatin.   aspirin with history of for CAD.  We will arrange an echocardiographic study to reassess the left ventricle systolic function and valvular regurgitation.  We'll see him back in 6 month R depends on patient clinical conditions.  Diagnoses and all orders for this visit:    Essential hypertension    Atherosclerosis of native coronary artery of native heart without angina pectoris    Chronic atrial fibrillation    Non-rheumatic tricuspid valve insufficiency  -     Adult Transthoracic Echo Complete    Heart failure with preserved ejection fraction, borderline, class II  -     Adult Transthoracic Echo Complete        Adam Martin MD  4/21/2017  8:42 AM

## 2017-04-29 LAB
BH CV ECHO MEAS - ACS: 1.6 CM
BH CV ECHO MEAS - AI DEC SLOPE: 181.5 CM/SEC^2
BH CV ECHO MEAS - AI MAX PG: 59.7 MMHG
BH CV ECHO MEAS - AI MAX VEL: 386 CM/SEC
BH CV ECHO MEAS - AI P1/2T: 622.9 MSEC
BH CV ECHO MEAS - AO MAX PG (FULL): 8.1 MMHG
BH CV ECHO MEAS - AO MAX PG: 10.8 MMHG
BH CV ECHO MEAS - AO MEAN PG (FULL): 4 MMHG
BH CV ECHO MEAS - AO MEAN PG: 5 MMHG
BH CV ECHO MEAS - AO ROOT AREA (BSA CORRECTED): 1.8
BH CV ECHO MEAS - AO ROOT AREA: 9.1 CM^2
BH CV ECHO MEAS - AO ROOT DIAM: 3.4 CM
BH CV ECHO MEAS - AO V2 MAX: 164 CM/SEC
BH CV ECHO MEAS - AO V2 MEAN: 104 CM/SEC
BH CV ECHO MEAS - AO V2 VTI: 34.1 CM
BH CV ECHO MEAS - BSA(HAYCOCK): 1.9 M^2
BH CV ECHO MEAS - BSA: 1.9 M^2
BH CV ECHO MEAS - BZI_BMI: 26.2 KILOGRAMS/M^2
BH CV ECHO MEAS - BZI_METRIC_HEIGHT: 170.2 CM
BH CV ECHO MEAS - BZI_METRIC_WEIGHT: 75.8 KG
BH CV ECHO MEAS - EDV(CUBED): 157.5 ML
BH CV ECHO MEAS - EDV(TEICH): 141.3 ML
BH CV ECHO MEAS - EF(CUBED): 45.9 %
BH CV ECHO MEAS - EF(TEICH): 37.9 %
BH CV ECHO MEAS - EPSS: 0.4 CM
BH CV ECHO MEAS - ESV(CUBED): 85.2 ML
BH CV ECHO MEAS - ESV(TEICH): 87.7 ML
BH CV ECHO MEAS - FS: 18.5 %
BH CV ECHO MEAS - IVS/LVPW: 1.2
BH CV ECHO MEAS - IVSD: 2 CM
BH CV ECHO MEAS - LA DIMENSION: 5.1 CM
BH CV ECHO MEAS - LA/AO: 1.5
BH CV ECHO MEAS - LV MASS(C)D: 496.6 GRAMS
BH CV ECHO MEAS - LV MASS(C)DI: 265.1 GRAMS/M^2
BH CV ECHO MEAS - LV MAX PG: 2.6 MMHG
BH CV ECHO MEAS - LV MEAN PG: 1 MMHG
BH CV ECHO MEAS - LV V1 MAX: 80.8 CM/SEC
BH CV ECHO MEAS - LV V1 MEAN: 51.2 CM/SEC
BH CV ECHO MEAS - LV V1 VTI: 19.3 CM
BH CV ECHO MEAS - LVIDD: 5.4 CM
BH CV ECHO MEAS - LVIDS: 4.4 CM
BH CV ECHO MEAS - LVPWD: 1.7 CM
BH CV ECHO MEAS - MR MAX PG: 91.8 MMHG
BH CV ECHO MEAS - MR MAX VEL: 476.5 CM/SEC
BH CV ECHO MEAS - MV E MAX VEL: 93.8 CM/SEC
BH CV ECHO MEAS - PA MAX PG: 3.8 MMHG
BH CV ECHO MEAS - PA MEAN PG: 2 MMHG
BH CV ECHO MEAS - PA V2 MAX: 97.5 CM/SEC
BH CV ECHO MEAS - PA V2 MEAN: 62.1 CM/SEC
BH CV ECHO MEAS - PA V2 VTI: 18.5 CM
BH CV ECHO MEAS - PI END-D VEL: 145 CM/SEC
BH CV ECHO MEAS - RAP SYSTOLE: 10 MMHG
BH CV ECHO MEAS - RVDD: 2.3 CM
BH CV ECHO MEAS - RVSP: 54.4 MMHG
BH CV ECHO MEAS - SI(AO): 165.3 ML/M^2
BH CV ECHO MEAS - SI(CUBED): 38.6 ML/M^2
BH CV ECHO MEAS - SI(TEICH): 28.6 ML/M^2
BH CV ECHO MEAS - SV(AO): 309.6 ML
BH CV ECHO MEAS - SV(CUBED): 72.3 ML
BH CV ECHO MEAS - SV(TEICH): 53.6 ML
BH CV ECHO MEAS - TR MAX VEL: 331.3 CM/SEC
LV EF 2D ECHO EST: 55 %

## 2017-05-03 ENCOUNTER — TELEPHONE (OUTPATIENT)
Dept: CARDIOLOGY | Facility: CLINIC | Age: 82
End: 2017-05-03

## 2017-06-01 ENCOUNTER — APPOINTMENT (OUTPATIENT)
Dept: WOUND CARE | Facility: HOSPITAL | Age: 82
End: 2017-06-01
Attending: PLASTIC SURGERY

## 2017-06-01 PROCEDURE — G0463 HOSPITAL OUTPT CLINIC VISIT: HCPCS

## 2017-06-15 RX ORDER — FUROSEMIDE 40 MG/1
TABLET ORAL
Qty: 90 TABLET | Refills: 3 | Status: SHIPPED | OUTPATIENT
Start: 2017-06-15 | End: 2018-01-01 | Stop reason: SDUPTHER

## 2017-07-03 ENCOUNTER — OFFICE VISIT (OUTPATIENT)
Dept: FAMILY MEDICINE CLINIC | Facility: CLINIC | Age: 82
End: 2017-07-03

## 2017-07-03 VITALS
OXYGEN SATURATION: 92 % | HEART RATE: 74 BPM | DIASTOLIC BLOOD PRESSURE: 70 MMHG | BODY MASS INDEX: 26.53 KG/M2 | WEIGHT: 169 LBS | SYSTOLIC BLOOD PRESSURE: 140 MMHG | HEIGHT: 67 IN

## 2017-07-03 DIAGNOSIS — I10 ESSENTIAL HYPERTENSION: Primary | ICD-10-CM

## 2017-07-03 PROCEDURE — 99212 OFFICE O/P EST SF 10 MIN: CPT | Performed by: GENERAL PRACTICE

## 2017-07-03 NOTE — PROGRESS NOTES
Subjective   Darian Pedroza is a 89 y.o. male.   Chief Complaint   Patient presents with   • Hypertension   • Hyperlipidemia     For review and evaluation of management of chronic medical problems. Scored high on his PHQ-9 but relates this to physical symptoms. Denies any depression.   Hypertension   This is a chronic problem. The current episode started more than 1 year ago. The problem is unchanged. The problem is controlled. Pertinent negatives include no chest pain, headaches, neck pain, palpitations or shortness of breath. There are no associated agents to hypertension. Past treatments include diuretics and angiotensin blockers. The current treatment provides significant improvement. There are no compliance problems.    Atrial Fibrillation   Presents for follow-up visit. Symptoms are negative for bradycardia, chest pain, dizziness, hypertension, hypotension, palpitations, shortness of breath, syncope, tachycardia and weakness. The symptoms have been resolved. Past medical history includes atrial fibrillation. There are no medication compliance problems.      The following portions of the patient's history were reviewed and updated as appropriate: allergies, current medications, past social history and problem list.    Outpatient Medications Prior to Visit   Medication Sig Dispense Refill   • aspirin 81 MG chewable tablet Chew 81 mg Daily.     • atorvastatin (LIPITOR) 40 MG tablet Take 40 mg by mouth Daily.     • bimatoprost (LUMIGAN) 0.01 % ophthalmic drops 1 drop Every Night.     • ferrous sulfate 325 (65 FE) MG tablet Take 1 tablet by mouth 2 (Two) Times a Day. 180 tablet 2   • furosemide (LASIX) 40 MG tablet TAKE 1 TABLET DAILY 90 tablet 3   • losartan (COZAAR) 100 MG tablet Take 100 mg by mouth 2 (Two) Times a Day.     • spironolactone (ALDACTONE) 25 MG tablet TAKE 1/2 TABLET DAILY 45 tablet 3   • vitamin D (ERGOCALCIFEROL) 74874 UNITS capsule capsule TAKE 1 CAPSULE ONCE WEEKLY 12 capsule 3   •  "amLODIPine (NORVASC) 5 MG tablet Take 5 mg by mouth Daily.     • B Complex Vitamins (VITAMIN B COMPLEX) capsule capsule Take 1 capsule by mouth Daily.     • colchicine 0.6 MG tablet Take 0.6 mg by mouth Daily.     • diltiazem XR (DILT-XR) 120 MG 24 hr capsule Take 1 capsule by mouth daily. 90 capsule 2   • dorzolamide (TRUSOPT) 2 % ophthalmic solution 1 drop 2 (Two) Times a Day.     • metOLazone (ZAROXOLYN) 2.5 MG tablet Take 2.5 mg by mouth Daily.       No facility-administered medications prior to visit.        Review of Systems   Constitutional: Negative.  Negative for activity change, appetite change, chills, fatigue, fever and unexpected weight change.   HENT: Negative.  Negative for congestion, ear pain, hearing loss, nosebleeds, rhinorrhea, sinus pressure, sneezing, sore throat, tinnitus and trouble swallowing.    Eyes: Negative.  Negative for pain, discharge, redness, itching and visual disturbance.   Respiratory: Negative.  Negative for apnea, cough, chest tightness, shortness of breath and wheezing.    Cardiovascular: Negative.  Negative for chest pain, palpitations, leg swelling and syncope.   Gastrointestinal: Negative.  Negative for abdominal distention, abdominal pain, constipation, diarrhea, nausea and vomiting.   Endocrine: Negative.    Genitourinary: Negative.  Negative for dysuria, frequency and urgency.   Musculoskeletal: Negative.  Negative for arthralgias, back pain, gait problem, joint swelling, neck pain and neck stiffness.   Skin: Negative.  Negative for color change and rash.   Allergic/Immunologic: Negative.    Neurological: Negative.  Negative for dizziness, weakness, light-headedness, numbness and headaches.   Hematological: Negative.  Negative for adenopathy.   Psychiatric/Behavioral: Negative.  Negative for dysphoric mood and sleep disturbance. The patient is not nervous/anxious.        Objective   Visit Vitals   • /70   • Pulse 74   • Ht 67\" (170.2 cm)   • Wt 169 lb (76.7 kg)   • " SpO2 92%   • BMI 26.47 kg/m2     Physical Exam   Constitutional: He is oriented to person, place, and time. He appears well-developed and well-nourished. No distress.   HENT:   Head: Normocephalic.   Nose: Nose normal.   Mouth/Throat: Oropharynx is clear and moist.   Eyes: Conjunctivae and EOM are normal. Pupils are equal, round, and reactive to light. Right eye exhibits no discharge. Left eye exhibits no discharge.   Neck: No thyromegaly present.   Cardiovascular: Normal rate, normal heart sounds and intact distal pulses.  An irregularly irregular rhythm present.   No murmur heard.  Pulmonary/Chest: Effort normal and breath sounds normal.   Musculoskeletal: He exhibits no edema.   Lymphadenopathy:     He has no cervical adenopathy.   Neurological: He is alert and oriented to person, place, and time.   Skin: Skin is warm and dry.   Psychiatric: He has a normal mood and affect.   Nursing note and vitals reviewed.    Assessment/Plan   Problem List Items Addressed This Visit        Cardiovascular and Mediastinum    Essential hypertension - Primary        Continue current treatment.     No orders of the defined types were placed in this encounter.    Return in about 4 months (around 11/13/2017) for medicare wellness visit.

## 2017-09-13 ENCOUNTER — CLINICAL SUPPORT (OUTPATIENT)
Dept: CARDIOLOGY | Facility: CLINIC | Age: 82
End: 2017-09-13

## 2017-09-13 DIAGNOSIS — I48.20 CHRONIC ATRIAL FIBRILLATION (HCC): Primary | ICD-10-CM

## 2017-09-13 PROCEDURE — 93288 INTERROG EVL PM/LDLS PM IP: CPT | Performed by: INTERNAL MEDICINE

## 2017-09-15 RX ORDER — ATORVASTATIN CALCIUM 40 MG/1
TABLET, FILM COATED ORAL
Qty: 90 TABLET | Refills: 3 | Status: SHIPPED | OUTPATIENT
Start: 2017-09-15 | End: 2018-01-01 | Stop reason: SDUPTHER

## 2017-09-18 NOTE — PROGRESS NOTES
Mr. 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 SR 1210 pacemaker  Battery status was adequate at 2.95V and the patient was ventricular paced 82% of the time.  Estimated longevity was 9.5 10.2 years  The pacing mode was VVIR rate 60 bpm.  Ventricular lead sensing was 7 mV and threshold 1.12V at 0.5 ms and impedance 280 ohms.  THERE was 111 beat run of high ventricular rate.   upgrade was completed.  No changes made.  Pacemaker functioning well.

## 2017-11-07 NOTE — PROGRESS NOTES
QUICK REFERENCE INFORMATION:  The ABCs of the Annual Wellness Visit    Initial Medicare Wellness Visit    HEALTH RISK ASSESSMENT    5/27/1928    Recent Hospitalizations:  No hospitalization(s) within the last year..    Current Medical Providers:  Patient Care Team:  Poonam Peña MD as PCP - General  Kaushik Panchal MD as Consulting Physician (Ophthalmology)  Adam Martin MD as Consulting Physician (Cardiology)  Juanito Melara MD as Consulting Physician (Gastroenterology)  Titi Gaviria MD as Consulting Physician (Nephrology)    Smoking Status:  History   Smoking Status   • Former Smoker   Smokeless Tobacco   • Never Used     Alcohol Consumption:  History   Alcohol Use No     Depression Screen:   PHQ-2/PHQ-9 Depression Screening 11/7/2017   Little interest or pleasure in doing things 0   Feeling down, depressed, or hopeless 0   Trouble falling or staying asleep, or sleeping too much 0   Feeling tired or having little energy 0   Poor appetite or overeating 0   Feeling bad about yourself - or that you are a failure or have let yourself or your family down 0   Trouble concentrating on things, such as reading the newspaper or watching television 0   Moving or speaking so slowly that other people could have noticed. Or the opposite - being so fidgety or restless that you have been moving around a lot more than usual 1   Thoughts that you would be better off dead, or of hurting yourself in some way 0   Total Score 1   If you checked off any problems, how difficult have these problems made it for you to do your work, take care of things at home, or get along with other people? Not difficult at all     Health Habits and Functional and Cognitive Screening:  Functional & Cognitive Status 11/7/2017   Do you have difficulty preparing food and eating? No   Do you have difficulty bathing yourself, getting dressed or grooming yourself? No   Do you have difficulty using the toilet? No   Do you have difficulty moving  around from place to place? No   Do you have trouble with steps or getting out of a bed or a chair? No   In the past year have you fallen or experienced a near fall? Yes   Current Diet Well Balanced Diet   Dental Exam Up to date   Eye Exam Up to date   Exercise (times per week) 0 times per week   Do you need help using the phone?  No   Are you deaf or do you have serious difficulty hearing?  No   Do you need help with transportation? No   Do you need help shopping? No   Do you need help preparing meals?  No   Do you need help with housework?  No   Do you need help with laundry? No   Do you need help taking your medications? No   Do you need help managing money? No   Have you felt unusual stress, anger or loneliness in the last month? No   Who do you live with? Spouse   If you need help, do you have trouble finding someone available to you? No   Do you have difficulty concentrating, remembering or making decisions? No     Does the patient have evidence of cognitive impairment? No    Asiprin use counseling: Taking ASA appropriately as indicated      Recent Lab Results:    Visual Acuity:  No exam data present    Age-appropriate Screening Schedule:  Refer to the list below for future screening recommendations based on patient's age, sex and/or medical conditions. Orders for these recommended tests are listed in the plan section. The patient has been provided with a written plan.    Health Maintenance   Topic Date Due   • TDAP/TD VACCINES (2 - Td) 07/03/2027   • INFLUENZA VACCINE  Completed   • PNEUMOCOCCAL VACCINES (65+ LOW/MEDIUM RISK)  Completed   • ZOSTER VACCINE  Addressed        Subjective   History of Present Illness    Darian Pedroza is a 89 y.o. male who presents for an Annual Wellness Visit.    The following portions of the patient's history were reviewed and updated as appropriate: allergies, current medications, past family history, past medical history, past social history, past surgical history and  problem list.    Outpatient Medications Prior to Visit   Medication Sig Dispense Refill   • aspirin 81 MG chewable tablet Chew 81 mg Daily.     • atorvastatin (LIPITOR) 40 MG tablet TAKE 1 TABLET AT BEDTIME   FOR CHOLESTEROL 90 tablet 3   • bimatoprost (LUMIGAN) 0.01 % ophthalmic drops 1 drop Every Night.     • ferrous sulfate 325 (65 FE) MG tablet Take 1 tablet by mouth 2 (Two) Times a Day. 180 tablet 2   • furosemide (LASIX) 40 MG tablet TAKE 1 TABLET DAILY 90 tablet 3   • losartan (COZAAR) 100 MG tablet Take 100 mg by mouth 2 (Two) Times a Day.     • spironolactone (ALDACTONE) 25 MG tablet TAKE 1/2 TABLET DAILY 45 tablet 3   • vitamin D (ERGOCALCIFEROL) 59718 UNITS capsule capsule TAKE 1 CAPSULE ONCE WEEKLY 12 capsule 3     No facility-administered medications prior to visit.        Patient Active Problem List   Diagnosis   • Hypertension   • Coronary atherosclerosis of native coronary artery   • Chronic atrial fibrillation   • Non-rheumatic tricuspid valve insufficiency   • Heart failure with preserved ejection fraction, borderline, class II   • Anemia   • Essential hypertension   • Vitamin D deficiency   • Renal impairment   • Cataract   • Non-rheumatic tricuspid valve insufficiency       Advance Care Planning:  has NO advance directive - information provided to the patient today    Identification of Risk Factors:  Risk factors include: weight , unhealthy diet, inactivity and increased fall risk.    Review of Systems    Compared to one year ago, the patient feels his physical health is the same.  Compared to one year ago, the patient feels his mental health is better.    Objective     Physical Exam   Constitutional: He is oriented to person, place, and time. He appears well-developed and well-nourished. No distress.   HENT:   Head: Normocephalic.   Nose: Nose normal.   Mouth/Throat: Oropharynx is clear and moist.   Eyes: Conjunctivae and EOM are normal. Pupils are equal, round, and reactive to light. Right eye  "exhibits no discharge. Left eye exhibits no discharge.   Neck: No thyromegaly present.   Cardiovascular: Normal rate, regular rhythm, normal heart sounds and intact distal pulses.    No murmur heard.  Pulmonary/Chest: Effort normal and breath sounds normal.   Musculoskeletal: He exhibits no edema.   Lymphadenopathy:     He has no cervical adenopathy.   Neurological: He is alert and oriented to person, place, and time.   Skin: Skin is warm and dry.   Psychiatric: He has a normal mood and affect.   Nursing note and vitals reviewed.      Vitals:    11/07/17 0938   BP: 148/65   BP Location: Left arm   Patient Position: Sitting   Cuff Size: Adult   Pulse: 65   SpO2: 98%   Weight: 174 lb 4.8 oz (79.1 kg)   Height: 67\" (170.2 cm)   PainSc: 0-No pain       Body mass index is 27.3 kg/(m^2).  Discussed the patient's BMI with him. The BMI is above average; BMI management plan is completed.    Assessment/Plan   Patient Self-Management and Personalized Health Advice  The patient has been provided with information about: diet, exercise, weight management and fall prevention and preventive services including:   · Advance directive, Exercise counseling provided, Fall Risk assessment done, Fall Risk plan of care done, Influenza vaccine.    Visit Diagnoses:    ICD-10-CM ICD-9-CM   1. Medicare annual wellness visit, initial Z00.00 V70.0   2. Vitamin D deficiency E55.9 268.9   3. Anemia, unspecified type D64.9 285.9   4. Essential hypertension I10 401.9   5. Need for immunization against influenza Z23 V04.81       Orders Placed This Encounter   Procedures   • Flu Vaccine High Dose PF 65YR+ ([id number])   • Comprehensive Metabolic Panel     Standing Status:   Future     Number of Occurrences:   1     Standing Expiration Date:   11/7/2018   • Vitamin D 25 Hydroxy     Standing Status:   Future     Number of Occurrences:   1     Standing Expiration Date:   11/7/2018   • CBC & Differential     Standing Status:   Future     Number of " Occurrences:   1     Standing Expiration Date:   11/7/2018     Order Specific Question:   Manual Differential     Answer:   No   • Urinalysis With Microscopic - Urine, Clean Catch     Standing Status:   Future     Number of Occurrences:   1     Standing Expiration Date:   11/7/2018       Outpatient Encounter Prescriptions as of 11/7/2017   Medication Sig Dispense Refill   • aspirin 81 MG chewable tablet Chew 81 mg Daily.     • atorvastatin (LIPITOR) 40 MG tablet TAKE 1 TABLET AT BEDTIME   FOR CHOLESTEROL 90 tablet 3   • bimatoprost (LUMIGAN) 0.01 % ophthalmic drops 1 drop Every Night.     • ferrous sulfate 325 (65 FE) MG tablet Take 1 tablet by mouth 2 (Two) Times a Day. 180 tablet 2   • furosemide (LASIX) 40 MG tablet TAKE 1 TABLET DAILY 90 tablet 3   • losartan (COZAAR) 100 MG tablet Take 100 mg by mouth 2 (Two) Times a Day.     • spironolactone (ALDACTONE) 25 MG tablet TAKE 1/2 TABLET DAILY 45 tablet 3   • vitamin D (ERGOCALCIFEROL) 45770 UNITS capsule capsule TAKE 1 CAPSULE ONCE WEEKLY 12 capsule 3     No facility-administered encounter medications on file as of 11/7/2017.        Reviewed use of high risk medication in the elderly: yes  Reviewed for potential of harmful drug interactions in the elderly: yes    Follow Up:  Return in about 6 months (around 5/7/2018) for Recheck.     An After Visit Summary and PPPS with all of these plans were given to the patient.    Information has been scanned into chart.  Discussed importance of taking medications as prescribed. Encouraged healthy eating habits with low fat, low salt choices and working towards maintaining a healthy weight. Recommended regular exercise if able as well as care to prevent falls.

## 2017-11-07 NOTE — PROGRESS NOTES
Subjective   Darian Pedroza is a 89 y.o. male.   Chief Complaint   Patient presents with   • Follow-up     [facility]   • Hypertension     For review and evaluation of management of chronic medical problems. Labs pending.   Hypertension   This is a chronic problem. The current episode started more than 1 year ago. The problem is unchanged. The problem is controlled. Pertinent negatives include no chest pain, headaches, neck pain, palpitations or shortness of breath. There are no associated agents to hypertension. Past treatments include diuretics and angiotensin blockers. The current treatment provides significant improvement. There are no compliance problems.    Atrial Fibrillation   Presents for follow-up visit. Symptoms are negative for bradycardia, chest pain, dizziness, hypertension, hypotension, palpitations, shortness of breath, syncope, tachycardia and weakness. The symptoms have been resolved. Past medical history includes atrial fibrillation. There are no medication compliance problems.      The following portions of the patient's history were reviewed and updated as appropriate: allergies, current medications, past social history and problem list.    Outpatient Medications Prior to Visit   Medication Sig Dispense Refill   • aspirin 81 MG chewable tablet Chew 81 mg Daily.     • atorvastatin (LIPITOR) 40 MG tablet TAKE 1 TABLET AT BEDTIME   FOR CHOLESTEROL 90 tablet 3   • bimatoprost (LUMIGAN) 0.01 % ophthalmic drops 1 drop Every Night.     • ferrous sulfate 325 (65 FE) MG tablet Take 1 tablet by mouth 2 (Two) Times a Day. 180 tablet 2   • furosemide (LASIX) 40 MG tablet TAKE 1 TABLET DAILY 90 tablet 3   • losartan (COZAAR) 100 MG tablet Take 100 mg by mouth 2 (Two) Times a Day.     • spironolactone (ALDACTONE) 25 MG tablet TAKE 1/2 TABLET DAILY 45 tablet 3   • vitamin D (ERGOCALCIFEROL) 72510 UNITS capsule capsule TAKE 1 CAPSULE ONCE WEEKLY 12 capsule 3     No facility-administered medications prior to  "visit.        Review of Systems   Constitutional: Negative.  Negative for chills, fatigue, fever and unexpected weight change.   HENT: Negative.  Negative for congestion, ear pain, hearing loss, nosebleeds, rhinorrhea, sneezing, sore throat and tinnitus.    Eyes: Negative.  Negative for discharge.   Respiratory: Negative.  Negative for cough, shortness of breath and wheezing.    Cardiovascular: Negative.  Negative for chest pain, palpitations and syncope.   Gastrointestinal: Negative.  Negative for abdominal pain, constipation, diarrhea, nausea and vomiting.   Endocrine: Negative.    Genitourinary: Negative.  Negative for dysuria, frequency and urgency.   Musculoskeletal: Negative.  Negative for arthralgias, back pain, joint swelling, myalgias and neck pain.   Skin: Negative.  Negative for rash.   Allergic/Immunologic: Negative.    Neurological: Negative.  Negative for dizziness, weakness, numbness and headaches.   Hematological: Negative.  Negative for adenopathy.   Psychiatric/Behavioral: Negative.  Negative for dysphoric mood and sleep disturbance. The patient is not nervous/anxious.        Objective   Visit Vitals   • /65 (BP Location: Left arm, Patient Position: Sitting, Cuff Size: Adult)   • Pulse 65   • Ht 67\" (170.2 cm)   • Wt 174 lb 4.8 oz (79.1 kg)   • SpO2 98%   • BMI 27.3 kg/m2     Physical Exam   Constitutional: He is oriented to person, place, and time. He appears well-developed and well-nourished. No distress.   HENT:   Head: Normocephalic.   Nose: Nose normal.   Mouth/Throat: Oropharynx is clear and moist.   Eyes: Conjunctivae and EOM are normal. Pupils are equal, round, and reactive to light. Right eye exhibits no discharge. Left eye exhibits no discharge.   Neck: No thyromegaly present.   Cardiovascular: Normal rate, regular rhythm, normal heart sounds and intact distal pulses.    No murmur heard.  Pulmonary/Chest: Effort normal and breath sounds normal.   Musculoskeletal: He exhibits no " edema.   Lymphadenopathy:     He has no cervical adenopathy.   Neurological: He is alert and oriented to person, place, and time.   Skin: Skin is warm and dry.   Psychiatric: He has a normal mood and affect.   Nursing note and vitals reviewed.       Assessment/Plan   Problem List Items Addressed This Visit        Cardiovascular and Mediastinum    Essential hypertension    Relevant Orders    Comprehensive Metabolic Panel (Completed)    Urinalysis With Microscopic - Urine, Clean Catch (Completed)       Digestive    Vitamin D deficiency    Relevant Orders    Vitamin D 25 Hydroxy (Completed)       Hematopoietic and Hemostatic    Anemia    Relevant Orders    CBC & Differential (Completed)      Other Visit Diagnoses     Medicare annual wellness visit, initial    -  Primary    Need for immunization against influenza        Relevant Orders    Flu Vaccine High Dose PF 65YR+ ([id number]) (Completed)        Continue current treatment.     No orders of the defined types were placed in this encounter.    Return in about 6 months (around 5/7/2018) for Recheck.

## 2017-11-12 NOTE — PATIENT INSTRUCTIONS
Medicare Wellness  Personal Prevention Plan of Service     Date of Office Visit:  2017  Encounter Provider:  Poonam Peña MD  Place of Service:  [facility]  Patient Name: Darian Pedroza  :  1928    As part of the Medicare Wellness portion of your visit today, we are providing you with this personalized preventive plan of services (PPPS). This plan is based upon recommendations of the [place] Preventive Services Task Force (USPSTF) and the Advisory Committee on Immunization Practices (ACIP).    This lists the preventive care services that should be considered, and provides dates of when you are due. Items listed as completed are up-to-date and do not require any further intervention.    Health Maintenance   Topic Date Due   • MEDICARE ANNUAL WELLNESS  2018   • TDAP/TD VACCINES (2 - Td) 2027   • INFLUENZA VACCINE  Completed   • PNEUMOCOCCAL VACCINES (65+ LOW/MEDIUM RISK)  Completed   • ZOSTER VACCINE  Addressed       Orders Placed This Encounter   Procedures   • Flu Vaccine High Dose PF 65YR+ ([id number])   • Comprehensive Metabolic Panel     Standing Status:   Future     Number of Occurrences:   1     Standing Expiration Date:   2018   • Vitamin D 25 Hydroxy     Standing Status:   Future     Number of Occurrences:   1     Standing Expiration Date:   2018   • CBC & Differential     Standing Status:   Future     Number of Occurrences:   1     Standing Expiration Date:   2018     Order Specific Question:   Manual Differential     Answer:   No   • Urinalysis With Microscopic - Urine, Clean Catch     Standing Status:   Future     Number of Occurrences:   1     Standing Expiration Date:   2018       No Follow-up on file.

## 2017-11-14 NOTE — PROGRESS NOTES
Darian Pedroza  89 y.o. male    11/14/2017  1. Essential hypertension    2. Atherosclerosis of native coronary artery of native heart without angina pectoris    3. Chronic atrial fibrillation    4. Non-rheumatic tricuspid valve insufficiency    5. 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  Had a strong history of GI bleed not a good candidate for long-term oral anti-conditions. .  He had mild shortness of breath on the basis of diastolic dysfunction.  Patient denies orthopnea PND, nauseous, vomiting, diarrhea.  Denies any polydipsia polyuria.  Denies any intermittent claudication    Recent ECHO  · Left ventricular function is normal. Estimated EF = 55%.  · Left ventricular wall thickness is consistent with mild concentric hypertrophy.  · Left atrial cavity size is severely dilated.  · Moderate tricuspid valve regurgitation is present.  · calcification of the aortic valve  · Mild mitral valve regurgitation is present  · Mild aortic valve regurgitation is present.  · Mild aortic valve stenosis is present.      SUBJECTIVE    No Known Allergies      Past Medical History:   Diagnosis Date   • Abrasion of lower limb    • Acute gastritis    • Allergic rhinitis    • Anal pain    • Anemia      chronic GI loss and renal failure      • Anemia     due to blood loss   • Atrophic gastritis    • Benign prostatic hyperplasia    • Bronchopneumonia    • CAD (coronary artery disease)    • Cellulitis and abscess of leg    • CHF (congestive heart failure)    • Chronic allergic conjunctivitis    • Chronic atrial  fibrillation    • COPD (chronic obstructive pulmonary disease)    • Coronary arteriosclerosis    • Coronary atherosclerosis of native coronary artery    • Cortical senile cataract    • Cough    • Diverticulitis of colon    • Dyspnea    • Edema     multifactorial   • Edema of leg    • Essential hypertension    • External hemorrhoids without complication    • Fever    • Flank pain    • Glaucoma     open angle   • Heart failure    • Hemorrhoids    • History of colon polyps    • Hypermetropia    • Hypertension    • Idiopathic gout     left ankle and foot   • Inguinal pain      probable injury to scar tissue related to penile implant      • Myocardial infarction    • Nasal congestion    • Nuclear cataract    • Occult blood in stools    • Onychomycosis    • Primary open angle glaucoma     advanced, progression of VF with IOP in mid teens for past 2+ years; on max tolerated meds      • Rectal hemorrhage    • Renal impairment    • Screening for malignant neoplasm of prostate    • URI (upper respiratory infection)    • UTI (urinary tract infection)    • Vitamin D deficiency          Past Surgical History:   Procedure Laterality Date   • ANAL FISSURECTOMY  03/05/1970    Fissurectomy & sphincterectomy. Anal fissure   • CARDIAC CATHETERIZATION  11/05/2002    [facility]. Jasper Carey M.D. LV w/ moderate hypokinesis. EF 50%. CAD w/ total occlusion CX & RCA high grade stenosis 1st OM. LIMA to LAD is patent, Vein graft to PDA, graft to diagonal branch, distal 2 branches CX.   • CORONARY ARTERY BYPASS GRAFT  1997    x 5, Dr JOSE Centeno   • ENDOSCOPY  05/15/2013    with tube-Normal esophagus. Gastritis in stomach. Biospy taken. Normal duodenum   • ENDOSCOPY AND COLONOSCOPY  06/03/2013    Internal & external hemorrhoids found   • EYE SURGERY  03/18/2013   • INJECTION OF MEDICATION  01/29/2016    Kenalog   • KIDNEY STONE SURGERY  09/10/1998    Dr Johnston   • MOUTH SURGERY  1987    Lower gum build up   "Juanito Prasad D.D.S.   • OTHER SURGICAL HISTORY  1988    Anesth, insert penis device    • PACEMAKER IMPLANTATION  03/2011   • REPLACEMENT TOTAL KNEE     • REPLACEMENT TOTAL KNEE  12/28/2005    Right, done by Dr Nails         Family History   Problem Relation Age of Onset   • Hypertension Other    • Heart disease Other    • Arthritis Mother    • Stroke Mother    • Cancer Brother          Social History     Social History   • Marital status:      Spouse name: N/A   • Number of children: N/A   • Years of education: N/A     Occupational History   • Not on file.     Social History Main Topics   • Smoking status: Former Smoker   • Smokeless tobacco: Never Used   • Alcohol use No   • Drug use: No   • Sexual activity: Defer     Other Topics Concern   • Not on file     Social History Narrative         Current Outpatient Prescriptions   Medication Sig Dispense Refill   • aspirin 81 MG chewable tablet Chew 81 mg Daily.     • atorvastatin (LIPITOR) 40 MG tablet TAKE 1 TABLET AT BEDTIME   FOR CHOLESTEROL 90 tablet 3   • bimatoprost (LUMIGAN) 0.01 % ophthalmic drops 1 drop Every Night.     • ferrous sulfate 325 (65 FE) MG tablet Take 1 tablet by mouth 2 (Two) Times a Day. 180 tablet 2   • furosemide (LASIX) 40 MG tablet TAKE 1 TABLET DAILY 90 tablet 3   • losartan (COZAAR) 100 MG tablet Take 100 mg by mouth 2 (Two) Times a Day.     • spironolactone (ALDACTONE) 25 MG tablet TAKE 1/2 TABLET DAILY 45 tablet 3   • vitamin D (ERGOCALCIFEROL) 67817 UNITS capsule capsule TAKE 1 CAPSULE ONCE WEEKLY 12 capsule 3     No current facility-administered medications for this visit.          OBJECTIVE    /70  Pulse 68  Ht 67\" (170.2 cm)  Wt 176 lb (79.8 kg)  BMI 27.57 kg/m2        Review of Systems     Constitutional:  Denies recent weight loss, weight gain, fever or chills, no change in exercise tolerance     HENT:  Denies any hearing loss, epistaxis, hoarseness, or difficulty speaking.     Eyes: Wears eyeglasses or " contact lenses     Respiratory:  Denies dyspnea with exertion,no cough, wheezing, or hemoptysis.     CardiovascularH&P    Gastrointestinal:  Denies change in bowel habits, dyspepsia, ulcer disease, hematochezia, or melena.     Endocrine: Negative for cold intolerance, heat intolerance, polydipsia, polyphagia and polyuria. Denies any history of weight change, heat/cold intolerance, polydipsia, polyuria     Genitourinary: Negative.      Musculoskeletal: Denies any history of arthritic symptoms or back problems     Skin:  Denies any change in hair or nails, rashes, or skin lesions.     Allergic/Immunologic: Negative.  Negative for environmental allergies, food allergies and immunocompromised state.     Neurological:  Denies any history of recurrent headaches, strokes, TIA, or seizure disorder.     Hematological: Denies any food allergies, seasonal allergies, bleeding disorders, or lymphadenopathy.     Psychiatric/Behavioral: Denies any history of depression, substance abuse, or change in cognitive function.         Physical Exam     Constitutional: Cooperative, alert and oriented, well-developed, well-nourished, in no acute distress.     HENT:   Head: Normocephalic, normal hair patterns, no masses or tenderness.  Ears, Nose, and Throat: No gross abnormalities. No pallor or cyanosis. Dentition good.   Eyes: EOMS intact, PERRL, conjunctivae and lids unremarkable. Fundoscopic exam and visual fields not performed.   Neck: No palpable masses or adenopathy, no thyromegaly, no JVD, carotid pulses are full and equal bilaterally and without  Bruits.     Cardiovascular: Regular rhythm, S1 and S2 normal, no S3 or S4. Apical impulse not displaced. No murmurs, gallops, or rubs detected.     Pulmonary/Chest: Chest: normal symmetry, no tenderness to palpation, normal respiratory excursion, no intercostal retraction, no use of accessory muscles.            Pulmonary: Normal breath sounds. No rales or ronchi.    Abdominal: Abdomen  soft, bowel sounds normoactive, no masses, no hepatosplenomegaly, non-tender, no bruits.     Musculoskeletal: No deformities, clubbing, cyanosis, erythema, or edema observed. There are no spinal abnormalities noted. Normal muscle strength and tone. Pulses full and equal in all extremities, no bruits auscultated.     Neurological: No gross motor or sensory deficits noted, affect appropriate, oriented to time, person, place.     Skin: Warm and dry to the touch, no apparent skin lesions or masses noted.     Psychiatric: He has a normal mood and affect. His behavior is normal. Judgment and thought content normal.         Procedures      Lab Results   Component Value Date    WBC 8.51 11/07/2017    HGB 12.6 (L) 11/07/2017    HCT 38.1 (L) 11/07/2017    MCV 96.0 11/07/2017     11/07/2017     Lab Results   Component Value Date    GLUCOSE 102 (H) 11/07/2017    BUN 27 (H) 11/07/2017    CREATININE 1.69 (H) 11/07/2017    EGFRIFAFRI 47 11/07/2017    BCR 16.0 11/07/2017    CO2 29.0 11/07/2017    CALCIUM 9.6 11/07/2017    ALBUMIN 4.50 11/07/2017    LABIL2 1.2 11/07/2017    AST 38 11/07/2017    ALT 24 11/07/2017     No results found for: CHOL  No results found for: TRIG  No results found for: HDL  No results found for: LDLCALC  No results found for: LDL  No results found for: HDLLDLRATIO  No components found for: CHOLHDL  No results found for: HGBA1C  Lab Results   Component Value Date    TSH 1.16 05/16/2014           ASSESSMENT AND PLAN       #1 CAD status post CABG #2 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  No evidence evidence of  active ischemia.  The patient's is still  physically very active and  with good mental status.  The patient will continue losartan for hypertension with good blood pressure control.  Zaroxolyn   as needed along with continuation of spironolactone.  Diltiazem as a part of AV jacquelyn blocking drug and atorvastatin.   aspirin with history of for CAD.  We will arrange an echocardiographic study to reassess the left ventricle systolic function and valvular regurgitation.  We'll see him back in 6 month R depends on patient clinical conditions.  Diagnoses and all orders for this visit:  Diagnoses and all orders for this visit:    Essential hypertension    Atherosclerosis of native coronary artery of native heart without angina pectoris    Chronic atrial fibrillation    Non-rheumatic tricuspid valve insufficiency    Heart failure with preserved ejection fraction, borderline, class II        Adam Martin MD  11/14/2017  3:19 PM

## 2017-11-17 NOTE — TELEPHONE ENCOUNTER
Pr Dr. Peña, Mr. Pedroza has been called with his recent lab results & recommendations.  Continue his current medications and follow-up as planned or sooner if any problems.    Repeat Labs ordered      ----- Message from Poonam Peña MD sent at 11/16/2017  5:24 PM CST -----  Call and tell labs look okay except he might have a UTI, would like him to repeat his urine test, put in an order for urinalysis and urine culture thanks

## 2017-11-17 NOTE — PROGRESS NOTES
Pr Dr. Peña, Mr. Pedroza has been called with his recent lab results & recommendations.  Continue his current medications and follow-up as planned or sooner if any problems.    Repeat Labs ordered

## 2018-01-01 ENCOUNTER — OFFICE VISIT (OUTPATIENT)
Dept: CARDIOLOGY | Facility: CLINIC | Age: 83
End: 2018-01-01

## 2018-01-01 ENCOUNTER — ANESTHESIA EVENT (OUTPATIENT)
Dept: GASTROENTEROLOGY | Facility: HOSPITAL | Age: 83
End: 2018-01-01

## 2018-01-01 ENCOUNTER — CLINICAL SUPPORT (OUTPATIENT)
Dept: CARDIOLOGY | Facility: CLINIC | Age: 83
End: 2018-01-01

## 2018-01-01 ENCOUNTER — LAB (OUTPATIENT)
Dept: LAB | Facility: HOSPITAL | Age: 83
End: 2018-01-01

## 2018-01-01 ENCOUNTER — OFFICE VISIT (OUTPATIENT)
Dept: FAMILY MEDICINE CLINIC | Facility: CLINIC | Age: 83
End: 2018-01-01

## 2018-01-01 ENCOUNTER — APPOINTMENT (OUTPATIENT)
Dept: GENERAL RADIOLOGY | Facility: HOSPITAL | Age: 83
End: 2018-01-01

## 2018-01-01 ENCOUNTER — CONSULT (OUTPATIENT)
Dept: SURGERY | Facility: CLINIC | Age: 83
End: 2018-01-01

## 2018-01-01 ENCOUNTER — ANESTHESIA (OUTPATIENT)
Dept: GASTROENTEROLOGY | Facility: HOSPITAL | Age: 83
End: 2018-01-01

## 2018-01-01 ENCOUNTER — APPOINTMENT (OUTPATIENT)
Dept: LAB | Facility: HOSPITAL | Age: 83
End: 2018-01-01

## 2018-01-01 ENCOUNTER — APPOINTMENT (OUTPATIENT)
Dept: CT IMAGING | Facility: HOSPITAL | Age: 83
End: 2018-01-01

## 2018-01-01 ENCOUNTER — TELEPHONE (OUTPATIENT)
Dept: FAMILY MEDICINE CLINIC | Facility: CLINIC | Age: 83
End: 2018-01-01

## 2018-01-01 ENCOUNTER — OFFICE VISIT (OUTPATIENT)
Dept: PODIATRY | Facility: CLINIC | Age: 83
End: 2018-01-01

## 2018-01-01 ENCOUNTER — HOSPITAL ENCOUNTER (EMERGENCY)
Facility: HOSPITAL | Age: 83
End: 2018-10-20
Attending: FAMILY MEDICINE | Admitting: INTERNAL MEDICINE

## 2018-01-01 ENCOUNTER — HOSPITAL ENCOUNTER (INPATIENT)
Facility: HOSPITAL | Age: 83
LOS: 8 days | Discharge: NURSING, REHAB OR LONG-TERM CARE | End: 2018-09-29
Attending: EMERGENCY MEDICINE | Admitting: FAMILY MEDICINE

## 2018-01-01 ENCOUNTER — APPOINTMENT (OUTPATIENT)
Dept: ULTRASOUND IMAGING | Facility: HOSPITAL | Age: 83
End: 2018-01-01

## 2018-01-01 VITALS
TEMPERATURE: 97 F | SYSTOLIC BLOOD PRESSURE: 139 MMHG | HEART RATE: 84 BPM | WEIGHT: 173.2 LBS | DIASTOLIC BLOOD PRESSURE: 64 MMHG | RESPIRATION RATE: 18 BRPM | HEIGHT: 67 IN | OXYGEN SATURATION: 97 % | BODY MASS INDEX: 27.18 KG/M2

## 2018-01-01 VITALS
HEIGHT: 67 IN | DIASTOLIC BLOOD PRESSURE: 72 MMHG | SYSTOLIC BLOOD PRESSURE: 140 MMHG | WEIGHT: 164 LBS | HEART RATE: 92 BPM | BODY MASS INDEX: 25.74 KG/M2 | OXYGEN SATURATION: 94 %

## 2018-01-01 VITALS
DIASTOLIC BLOOD PRESSURE: 60 MMHG | HEART RATE: 80 BPM | OXYGEN SATURATION: 95 % | WEIGHT: 183.9 LBS | HEIGHT: 67 IN | SYSTOLIC BLOOD PRESSURE: 140 MMHG | BODY MASS INDEX: 28.87 KG/M2

## 2018-01-01 VITALS
HEIGHT: 67 IN | OXYGEN SATURATION: 98 % | SYSTOLIC BLOOD PRESSURE: 132 MMHG | BODY MASS INDEX: 27.25 KG/M2 | DIASTOLIC BLOOD PRESSURE: 60 MMHG | HEART RATE: 71 BPM | WEIGHT: 173.6 LBS

## 2018-01-01 VITALS
HEIGHT: 67 IN | WEIGHT: 171 LBS | SYSTOLIC BLOOD PRESSURE: 130 MMHG | HEART RATE: 67 BPM | OXYGEN SATURATION: 96 % | DIASTOLIC BLOOD PRESSURE: 80 MMHG | BODY MASS INDEX: 26.84 KG/M2

## 2018-01-01 VITALS — WEIGHT: 171 LBS | BODY MASS INDEX: 26.84 KG/M2 | HEART RATE: 64 BPM | HEIGHT: 67 IN | OXYGEN SATURATION: 95 %

## 2018-01-01 VITALS
DIASTOLIC BLOOD PRESSURE: 100 MMHG | SYSTOLIC BLOOD PRESSURE: 217 MMHG | WEIGHT: 182.98 LBS | HEIGHT: 67 IN | BODY MASS INDEX: 28.72 KG/M2 | HEART RATE: 99 BPM

## 2018-01-01 VITALS
WEIGHT: 165 LBS | HEIGHT: 67 IN | BODY MASS INDEX: 25.9 KG/M2 | SYSTOLIC BLOOD PRESSURE: 122 MMHG | DIASTOLIC BLOOD PRESSURE: 70 MMHG

## 2018-01-01 DIAGNOSIS — I25.10 ATHEROSCLEROSIS OF NATIVE CORONARY ARTERY OF NATIVE HEART WITHOUT ANGINA PECTORIS: ICD-10-CM

## 2018-01-01 DIAGNOSIS — Z78.9 IMPAIRED MOBILITY AND ADLS: ICD-10-CM

## 2018-01-01 DIAGNOSIS — I10 ESSENTIAL HYPERTENSION: Primary | ICD-10-CM

## 2018-01-01 DIAGNOSIS — I50.30 HEART FAILURE WITH PRESERVED EJECTION FRACTION, BORDERLINE, CLASS II (HCC): ICD-10-CM

## 2018-01-01 DIAGNOSIS — L03.116 CELLULITIS OF LEFT LOWER EXTREMITY: Primary | ICD-10-CM

## 2018-01-01 DIAGNOSIS — I49.5 SSS (SICK SINUS SYNDROME) (HCC): ICD-10-CM

## 2018-01-01 DIAGNOSIS — D64.9 ANEMIA, UNSPECIFIED TYPE: ICD-10-CM

## 2018-01-01 DIAGNOSIS — K64.9 HEMORRHOIDS, UNSPECIFIED HEMORRHOID TYPE: Chronic | ICD-10-CM

## 2018-01-01 DIAGNOSIS — I36.1 NON-RHEUMATIC TRICUSPID VALVE INSUFFICIENCY: ICD-10-CM

## 2018-01-01 DIAGNOSIS — Z74.09 IMPAIRED MOBILITY AND ADLS: ICD-10-CM

## 2018-01-01 DIAGNOSIS — I48.20 CHRONIC ATRIAL FIBRILLATION (HCC): Chronic | ICD-10-CM

## 2018-01-01 DIAGNOSIS — Z95.0 PRESENCE OF CARDIAC PACEMAKER: ICD-10-CM

## 2018-01-01 DIAGNOSIS — K64.1 GRADE II HEMORRHOIDS: Primary | ICD-10-CM

## 2018-01-01 DIAGNOSIS — Z74.09 IMPAIRED FUNCTIONAL MOBILITY, BALANCE, GAIT, AND ENDURANCE: ICD-10-CM

## 2018-01-01 DIAGNOSIS — M79.671 RIGHT FOOT PAIN: ICD-10-CM

## 2018-01-01 DIAGNOSIS — N18.30 STAGE 3 CHRONIC KIDNEY DISEASE (HCC): ICD-10-CM

## 2018-01-01 DIAGNOSIS — I21.3 ST ELEVATION MYOCARDIAL INFARCTION (STEMI), UNSPECIFIED ARTERY (HCC): Primary | ICD-10-CM

## 2018-01-01 DIAGNOSIS — N18.30 STAGE 3 CHRONIC KIDNEY DISEASE (HCC): Primary | ICD-10-CM

## 2018-01-01 DIAGNOSIS — I49.5 SSS (SICK SINUS SYNDROME) (HCC): Primary | ICD-10-CM

## 2018-01-01 DIAGNOSIS — I10 ESSENTIAL HYPERTENSION: Primary | Chronic | ICD-10-CM

## 2018-01-01 DIAGNOSIS — Z23 NEED FOR IMMUNIZATION AGAINST INFLUENZA: ICD-10-CM

## 2018-01-01 DIAGNOSIS — M72.2 PLANTAR FASCIITIS: Primary | ICD-10-CM

## 2018-01-01 DIAGNOSIS — I48.20 CHRONIC ATRIAL FIBRILLATION (HCC): ICD-10-CM

## 2018-01-01 DIAGNOSIS — I46.9 CARDIAC ARREST (HCC): ICD-10-CM

## 2018-01-01 DIAGNOSIS — N18.30 STAGE 3 CHRONIC KIDNEY DISEASE (HCC): Chronic | ICD-10-CM

## 2018-01-01 LAB
25(OH)D3 SERPL-MCNC: 63.1 NG/ML (ref 30–100)
ALBUMIN SERPL-MCNC: 3.5 G/DL (ref 3.4–4.8)
ALBUMIN SERPL-MCNC: 3.8 G/DL (ref 3.4–4.8)
ALBUMIN/GLOB SERPL: 0.9 G/DL (ref 1.1–1.8)
ALBUMIN/GLOB SERPL: 1.2 G/DL (ref 1.1–1.8)
ALP SERPL-CCNC: 143 U/L (ref 38–126)
ALP SERPL-CCNC: 148 U/L (ref 38–126)
ALT SERPL W P-5'-P-CCNC: 24 U/L (ref 21–72)
ALT SERPL W P-5'-P-CCNC: 32 U/L (ref 21–72)
ANION GAP SERPL CALCULATED.3IONS-SCNC: 11 MMOL/L (ref 5–15)
ANION GAP SERPL CALCULATED.3IONS-SCNC: 12 MMOL/L (ref 5–15)
ANION GAP SERPL CALCULATED.3IONS-SCNC: 17 MMOL/L (ref 5–15)
ANION GAP SERPL CALCULATED.3IONS-SCNC: 4 MMOL/L (ref 5–15)
ANION GAP SERPL CALCULATED.3IONS-SCNC: 5 MMOL/L (ref 5–15)
ANION GAP SERPL CALCULATED.3IONS-SCNC: 6 MMOL/L (ref 5–15)
ANION GAP SERPL CALCULATED.3IONS-SCNC: 7 MMOL/L (ref 5–15)
ANION GAP SERPL CALCULATED.3IONS-SCNC: 8 MMOL/L (ref 5–15)
ANION GAP SERPL CALCULATED.3IONS-SCNC: 9 MMOL/L (ref 5–15)
ANION GAP SERPL CALCULATED.3IONS-SCNC: 9 MMOL/L (ref 5–15)
ANISOCYTOSIS BLD QL: ABNORMAL
ANISOCYTOSIS BLD QL: ABNORMAL
APTT PPP: 37.5 SECONDS (ref 20–40.3)
ARTERIAL PATENCY WRIST A: NEGATIVE
AST SERPL-CCNC: 20 U/L (ref 17–59)
AST SERPL-CCNC: 35 U/L (ref 17–59)
ATMOSPHERIC PRESS: 748 MMHG
BACTERIA FLD CULT: NORMAL
BACTERIA SPEC AEROBE CULT: NORMAL
BACTERIA UR QL AUTO: ABNORMAL /HPF
BACTERIA UR QL AUTO: ABNORMAL /HPF
BASE EXCESS BLDA CALC-SCNC: -7.9 MMOL/L (ref 0–2)
BASOPHILS # BLD AUTO: 0 10*3/MM3 (ref 0–0.2)
BASOPHILS # BLD AUTO: 0.01 10*3/MM3 (ref 0–0.2)
BASOPHILS # BLD AUTO: 0.01 10*3/MM3 (ref 0–0.2)
BASOPHILS # BLD AUTO: 0.02 10*3/MM3 (ref 0–0.2)
BASOPHILS # BLD AUTO: 0.02 10*3/MM3 (ref 0–0.2)
BASOPHILS # BLD AUTO: 0.03 10*3/MM3 (ref 0–0.2)
BASOPHILS # BLD MANUAL: 0.07 10*3/MM3 (ref 0–0.2)
BASOPHILS # BLD MANUAL: 0.28 10*3/MM3 (ref 0–0.2)
BASOPHILS NFR BLD AUTO: 0 % (ref 0–2)
BASOPHILS NFR BLD AUTO: 0.1 % (ref 0–2)
BASOPHILS NFR BLD AUTO: 0.1 % (ref 0–2)
BASOPHILS NFR BLD AUTO: 0.2 % (ref 0–2)
BASOPHILS NFR BLD AUTO: 0.2 % (ref 0–2)
BASOPHILS NFR BLD AUTO: 0.3 % (ref 0–2)
BASOPHILS NFR BLD AUTO: 1 % (ref 0–2)
BASOPHILS NFR BLD AUTO: 2 % (ref 0–2)
BDY SITE: ABNORMAL
BILIRUB SERPL-MCNC: 0.8 MG/DL (ref 0.2–1.3)
BILIRUB SERPL-MCNC: 1.4 MG/DL (ref 0.2–1.3)
BILIRUB UR QL STRIP: NEGATIVE
BILIRUB UR QL STRIP: NEGATIVE
BUN BLD-MCNC: 19 MG/DL (ref 7–21)
BUN BLD-MCNC: 23 MG/DL (ref 7–21)
BUN BLD-MCNC: 24 MG/DL (ref 7–21)
BUN BLD-MCNC: 26 MG/DL (ref 7–21)
BUN BLD-MCNC: 27 MG/DL (ref 7–21)
BUN BLD-MCNC: 29 MG/DL (ref 7–21)
BUN BLD-MCNC: 33 MG/DL (ref 7–21)
BUN BLD-MCNC: 36 MG/DL (ref 7–21)
BUN BLD-MCNC: 39 MG/DL (ref 7–21)
BUN BLD-MCNC: 41 MG/DL (ref 7–21)
BUN BLD-MCNC: 41 MG/DL (ref 7–21)
BUN/CREAT SERPL: 10.4 (ref 7–25)
BUN/CREAT SERPL: 16.1 (ref 7–25)
BUN/CREAT SERPL: 16.8 (ref 7–25)
BUN/CREAT SERPL: 17.8 (ref 7–25)
BUN/CREAT SERPL: 17.8 (ref 7–25)
BUN/CREAT SERPL: 18.2 (ref 7–25)
BUN/CREAT SERPL: 18.7 (ref 7–25)
BUN/CREAT SERPL: 19 (ref 7–25)
BUN/CREAT SERPL: 21.6 (ref 7–25)
BUN/CREAT SERPL: 22.2 (ref 7–25)
BUN/CREAT SERPL: 24.6 (ref 7–25)
BUN/CREAT SERPL: 25.2 (ref 7–25)
BUN/CREAT SERPL: 26.5 (ref 7–25)
CALCIUM SPEC-SCNC: 8 MG/DL (ref 8.4–10.2)
CALCIUM SPEC-SCNC: 8.1 MG/DL (ref 8.4–10.2)
CALCIUM SPEC-SCNC: 8.3 MG/DL (ref 8.4–10.2)
CALCIUM SPEC-SCNC: 8.4 MG/DL (ref 8.4–10.2)
CALCIUM SPEC-SCNC: 8.5 MG/DL (ref 8.4–10.2)
CALCIUM SPEC-SCNC: 8.6 MG/DL (ref 8.4–10.2)
CALCIUM SPEC-SCNC: 8.6 MG/DL (ref 8.4–10.2)
CALCIUM SPEC-SCNC: 8.7 MG/DL (ref 8.4–10.2)
CALCIUM SPEC-SCNC: 8.7 MG/DL (ref 8.4–10.2)
CALCIUM SPEC-SCNC: 8.8 MG/DL (ref 8.4–10.2)
CALCIUM SPEC-SCNC: 8.8 MG/DL (ref 8.4–10.2)
CALCIUM SPEC-SCNC: 9 MG/DL (ref 8.4–10.2)
CALCIUM SPEC-SCNC: 9 MG/DL (ref 8.4–10.2)
CHLORIDE SERPL-SCNC: 100 MMOL/L (ref 95–110)
CHLORIDE SERPL-SCNC: 101 MMOL/L (ref 95–110)
CHLORIDE SERPL-SCNC: 101 MMOL/L (ref 95–110)
CHLORIDE SERPL-SCNC: 102 MMOL/L (ref 95–110)
CHLORIDE SERPL-SCNC: 103 MMOL/L (ref 95–110)
CHLORIDE SERPL-SCNC: 104 MMOL/L (ref 95–110)
CHLORIDE SERPL-SCNC: 104 MMOL/L (ref 95–110)
CHLORIDE SERPL-SCNC: 106 MMOL/L (ref 95–110)
CHLORIDE SERPL-SCNC: 108 MMOL/L (ref 95–110)
CHLORIDE SERPL-SCNC: 108 MMOL/L (ref 95–110)
CHLORIDE SERPL-SCNC: 109 MMOL/L (ref 95–110)
CHLORIDE SERPL-SCNC: 110 MMOL/L (ref 95–110)
CHLORIDE SERPL-SCNC: 111 MMOL/L (ref 95–110)
CLARITY UR: ABNORMAL
CLARITY UR: CLEAR
CO2 SERPL-SCNC: 23 MMOL/L (ref 22–31)
CO2 SERPL-SCNC: 24 MMOL/L (ref 22–31)
CO2 SERPL-SCNC: 25 MMOL/L (ref 22–31)
CO2 SERPL-SCNC: 26 MMOL/L (ref 22–31)
CO2 SERPL-SCNC: 27 MMOL/L (ref 22–31)
CO2 SERPL-SCNC: 28 MMOL/L (ref 22–31)
CO2 SERPL-SCNC: 28 MMOL/L (ref 22–31)
CO2 SERPL-SCNC: 29 MMOL/L (ref 22–31)
CO2 SERPL-SCNC: 30 MMOL/L (ref 22–31)
CO2 SERPL-SCNC: 31 MMOL/L (ref 22–31)
COLOR UR: YELLOW
COLOR UR: YELLOW
CREAT BLD-MCNC: 1.35 MG/DL (ref 0.7–1.3)
CREAT BLD-MCNC: 1.43 MG/DL (ref 0.7–1.3)
CREAT BLD-MCNC: 1.52 MG/DL (ref 0.7–1.3)
CREAT BLD-MCNC: 1.53 MG/DL (ref 0.7–1.3)
CREAT BLD-MCNC: 1.53 MG/DL (ref 0.7–1.3)
CREAT BLD-MCNC: 1.55 MG/DL (ref 0.7–1.3)
CREAT BLD-MCNC: 1.59 MG/DL (ref 0.7–1.3)
CREAT BLD-MCNC: 1.62 MG/DL (ref 0.7–1.3)
CREAT BLD-MCNC: 1.67 MG/DL (ref 0.7–1.3)
CREAT BLD-MCNC: 1.82 MG/DL (ref 0.7–1.3)
CRP SERPL-MCNC: 6.5 MG/DL (ref 0–1)
CRP SERPL-MCNC: 6.5 MG/DL (ref 0–1)
CRYSTALS FLD MICRO: NORMAL
DEPRECATED RDW RBC AUTO: 43 FL (ref 35.1–43.9)
DEPRECATED RDW RBC AUTO: 45.3 FL (ref 35.1–43.9)
DEPRECATED RDW RBC AUTO: 45.9 FL (ref 35.1–43.9)
DEPRECATED RDW RBC AUTO: 46.1 FL (ref 35.1–43.9)
DEPRECATED RDW RBC AUTO: 46.3 FL (ref 35.1–43.9)
DEPRECATED RDW RBC AUTO: 46.4 FL (ref 35.1–43.9)
DEPRECATED RDW RBC AUTO: 46.7 FL (ref 35.1–43.9)
DEPRECATED RDW RBC AUTO: 46.7 FL (ref 35.1–43.9)
DEPRECATED RDW RBC AUTO: 47 FL (ref 35.1–43.9)
DEPRECATED RDW RBC AUTO: 47.1 FL (ref 35.1–43.9)
DEPRECATED RDW RBC AUTO: 47.7 FL (ref 35.1–43.9)
DEPRECATED RDW RBC AUTO: 51.7 FL (ref 35.1–43.9)
DEPRECATED RDW RBC AUTO: 62.5 FL (ref 35.1–43.9)
EOSINOPHIL # BLD AUTO: 0 10*3/MM3 (ref 0–0.7)
EOSINOPHIL # BLD AUTO: 0.01 10*3/MM3 (ref 0–0.7)
EOSINOPHIL # BLD AUTO: 0.06 10*3/MM3 (ref 0–0.7)
EOSINOPHIL # BLD AUTO: 0.15 10*3/MM3 (ref 0–0.7)
EOSINOPHIL # BLD AUTO: 0.17 10*3/MM3 (ref 0–0.7)
EOSINOPHIL # BLD AUTO: 0.22 10*3/MM3 (ref 0–0.7)
EOSINOPHIL # BLD AUTO: 0.23 10*3/MM3 (ref 0–0.7)
EOSINOPHIL # BLD AUTO: 0.25 10*3/MM3 (ref 0–0.7)
EOSINOPHIL # BLD MANUAL: 0.14 10*3/MM3 (ref 0–0.7)
EOSINOPHIL # BLD MANUAL: 1.17 10*3/MM3 (ref 0–0.7)
EOSINOPHIL NFR BLD AUTO: 0 % (ref 0–7)
EOSINOPHIL NFR BLD AUTO: 0.1 % (ref 0–7)
EOSINOPHIL NFR BLD AUTO: 0.6 % (ref 0–7)
EOSINOPHIL NFR BLD AUTO: 1.9 % (ref 0–7)
EOSINOPHIL NFR BLD AUTO: 1.9 % (ref 0–7)
EOSINOPHIL NFR BLD AUTO: 2.4 % (ref 0–7)
EOSINOPHIL NFR BLD AUTO: 2.5 % (ref 0–7)
EOSINOPHIL NFR BLD AUTO: 2.6 % (ref 0–7)
EOSINOPHIL NFR BLD MANUAL: 1 % (ref 0–7)
EOSINOPHIL NFR BLD MANUAL: 16 % (ref 0–7)
ERYTHROCYTE [DISTWIDTH] IN BLOOD BY AUTOMATED COUNT: 12.6 % (ref 11.5–14.5)
ERYTHROCYTE [DISTWIDTH] IN BLOOD BY AUTOMATED COUNT: 13.1 % (ref 11.5–14.5)
ERYTHROCYTE [DISTWIDTH] IN BLOOD BY AUTOMATED COUNT: 13.2 % (ref 11.5–14.5)
ERYTHROCYTE [DISTWIDTH] IN BLOOD BY AUTOMATED COUNT: 13.2 % (ref 11.5–14.5)
ERYTHROCYTE [DISTWIDTH] IN BLOOD BY AUTOMATED COUNT: 13.3 % (ref 11.5–14.5)
ERYTHROCYTE [DISTWIDTH] IN BLOOD BY AUTOMATED COUNT: 13.4 % (ref 11.5–14.5)
ERYTHROCYTE [DISTWIDTH] IN BLOOD BY AUTOMATED COUNT: 13.5 % (ref 11.5–14.5)
ERYTHROCYTE [DISTWIDTH] IN BLOOD BY AUTOMATED COUNT: 13.5 % (ref 11.5–14.5)
ERYTHROCYTE [DISTWIDTH] IN BLOOD BY AUTOMATED COUNT: 14.6 % (ref 11.5–14.5)
ERYTHROCYTE [DISTWIDTH] IN BLOOD BY AUTOMATED COUNT: 16.2 % (ref 11.5–14.5)
ERYTHROCYTE [SEDIMENTATION RATE] IN BLOOD: 96 MM/HR (ref 0–15)
FERRITIN SERPL-MCNC: 952 NG/ML (ref 17.9–464)
FOLATE SERPL-MCNC: 4.29 NG/ML (ref 2.76–21)
GFR SERPL CREATININE-BSD FRML MDRD: 43 ML/MIN/1.73 (ref 42–98)
GFR SERPL CREATININE-BSD FRML MDRD: 47 ML/MIN/1.73 (ref 42–98)
GFR SERPL CREATININE-BSD FRML MDRD: 49 ML/MIN/1.73 (ref 42–98)
GFR SERPL CREATININE-BSD FRML MDRD: 50 ML/MIN/1.73 (ref 42–98)
GFR SERPL CREATININE-BSD FRML MDRD: 51 ML/MIN/1.73 (ref 42–98)
GFR SERPL CREATININE-BSD FRML MDRD: 52 ML/MIN/1.73 (ref 42–98)
GFR SERPL CREATININE-BSD FRML MDRD: 56 ML/MIN/1.73 (ref 42–98)
GFR SERPL CREATININE-BSD FRML MDRD: 60 ML/MIN/1.73 (ref 42–98)
GLOBULIN UR ELPH-MCNC: 3 GM/DL (ref 2.3–3.5)
GLOBULIN UR ELPH-MCNC: 4.2 GM/DL (ref 2.3–3.5)
GLUCOSE BLD-MCNC: 103 MG/DL (ref 60–100)
GLUCOSE BLD-MCNC: 103 MG/DL (ref 60–100)
GLUCOSE BLD-MCNC: 105 MG/DL (ref 60–100)
GLUCOSE BLD-MCNC: 111 MG/DL (ref 60–100)
GLUCOSE BLD-MCNC: 113 MG/DL (ref 60–100)
GLUCOSE BLD-MCNC: 114 MG/DL (ref 60–100)
GLUCOSE BLD-MCNC: 121 MG/DL (ref 60–100)
GLUCOSE BLD-MCNC: 123 MG/DL (ref 60–100)
GLUCOSE BLD-MCNC: 123 MG/DL (ref 60–100)
GLUCOSE BLD-MCNC: 134 MG/DL (ref 60–100)
GLUCOSE BLD-MCNC: 170 MG/DL (ref 60–100)
GLUCOSE BLD-MCNC: 209 MG/DL (ref 60–100)
GLUCOSE BLD-MCNC: 98 MG/DL (ref 60–100)
GLUCOSE BLDC GLUCOMTR-MCNC: 114 MG/DL (ref 70–130)
GLUCOSE BLDC GLUCOMTR-MCNC: 133 MG/DL (ref 70–130)
GLUCOSE UR STRIP-MCNC: NEGATIVE MG/DL
GLUCOSE UR STRIP-MCNC: NEGATIVE MG/DL
GRAM STN SPEC: NORMAL
GRAM STN SPEC: NORMAL
HCO3 BLDA-SCNC: 23.4 MMOL/L (ref 20–26)
HCT VFR BLD AUTO: 22.5 % (ref 39–49)
HCT VFR BLD AUTO: 23.2 % (ref 39–49)
HCT VFR BLD AUTO: 23.3 % (ref 39–49)
HCT VFR BLD AUTO: 23.4 % (ref 39–49)
HCT VFR BLD AUTO: 23.7 % (ref 39–49)
HCT VFR BLD AUTO: 24.8 % (ref 39–49)
HCT VFR BLD AUTO: 25.1 % (ref 39–49)
HCT VFR BLD AUTO: 25.2 % (ref 39–49)
HCT VFR BLD AUTO: 26.5 % (ref 39–49)
HCT VFR BLD AUTO: 27 % (ref 39–49)
HCT VFR BLD AUTO: 27.5 % (ref 39–49)
HCT VFR BLD AUTO: 30.6 % (ref 39–49)
HCT VFR BLD AUTO: 31.6 % (ref 39–49)
HCT VFR BLD AUTO: 33.7 % (ref 39–49)
HEMOCCULT STL QL: POSITIVE
HGB BLD-MCNC: 10.4 G/DL (ref 13.7–17.3)
HGB BLD-MCNC: 11 G/DL (ref 13.7–17.3)
HGB BLD-MCNC: 7.5 G/DL (ref 13.7–17.3)
HGB BLD-MCNC: 7.8 G/DL (ref 13.7–17.3)
HGB BLD-MCNC: 7.9 G/DL (ref 13.7–17.3)
HGB BLD-MCNC: 7.9 G/DL (ref 13.7–17.3)
HGB BLD-MCNC: 8 G/DL (ref 13.7–17.3)
HGB BLD-MCNC: 8.4 G/DL (ref 13.7–17.3)
HGB BLD-MCNC: 8.5 G/DL (ref 13.7–17.3)
HGB BLD-MCNC: 8.8 G/DL (ref 13.7–17.3)
HGB BLD-MCNC: 9 G/DL (ref 13.7–17.3)
HGB BLD-MCNC: 9.4 G/DL (ref 13.7–17.3)
HGB BLD-MCNC: 9.5 G/DL (ref 13.7–17.3)
HGB RETIC QN: 26.8 PG (ref 30–38)
HGB UR QL STRIP.AUTO: NEGATIVE
HGB UR QL STRIP.AUTO: NEGATIVE
HOLD SPECIMEN: NORMAL
HOROWITZ INDEX BLD+IHG-RTO: 100 %
HYALINE CASTS UR QL AUTO: ABNORMAL /LPF
HYALINE CASTS UR QL AUTO: ABNORMAL /LPF
IMM GRANULOCYTES # BLD: 0.02 10*3/MM3 (ref 0–0.02)
IMM GRANULOCYTES # BLD: 0.03 10*3/MM3 (ref 0–0.02)
IMM GRANULOCYTES # BLD: 0.04 10*3/MM3 (ref 0–0.02)
IMM GRANULOCYTES # BLD: 0.04 10*3/MM3 (ref 0–0.02)
IMM GRANULOCYTES # BLD: 0.05 10*3/MM3 (ref 0–0.02)
IMM GRANULOCYTES # BLD: 0.07 10*3/MM3 (ref 0–0.02)
IMM GRANULOCYTES # BLD: 0.07 10*3/MM3 (ref 0–0.02)
IMM GRANULOCYTES NFR BLD: 0.2 % (ref 0–0.5)
IMM GRANULOCYTES NFR BLD: 0.3 % (ref 0–0.5)
IMM GRANULOCYTES NFR BLD: 0.5 % (ref 0–0.5)
IMM GRANULOCYTES NFR BLD: 0.5 % (ref 0–0.5)
IMM GRANULOCYTES NFR BLD: 0.6 % (ref 0–0.5)
IMM GRANULOCYTES NFR BLD: 0.7 % (ref 0–0.5)
IMM GRANULOCYTES NFR BLD: 0.8 % (ref 0–0.5)
IMM RETICS NFR: 6.9 % (ref 3–15.9)
INR PPP: 1.45 (ref 0.8–1.2)
IRON 24H UR-MRATE: 77 MCG/DL (ref 49–181)
IRON SATN MFR SERPL: 41 % (ref 20–55)
KETONES UR QL STRIP: NEGATIVE
KETONES UR QL STRIP: NEGATIVE
LAB AP CASE REPORT: NORMAL
LAB AP DIAGNOSIS COMMENT: NORMAL
LEUKOCYTE ESTERASE UR QL STRIP.AUTO: ABNORMAL
LEUKOCYTE ESTERASE UR QL STRIP.AUTO: ABNORMAL
LYMPHOCYTES # BLD AUTO: 0.86 10*3/MM3 (ref 0.6–4.2)
LYMPHOCYTES # BLD AUTO: 0.9 10*3/MM3 (ref 0.6–4.2)
LYMPHOCYTES # BLD AUTO: 0.93 10*3/MM3 (ref 0.6–4.2)
LYMPHOCYTES # BLD AUTO: 0.98 10*3/MM3 (ref 0.6–4.2)
LYMPHOCYTES # BLD AUTO: 1.02 10*3/MM3 (ref 0.6–4.2)
LYMPHOCYTES # BLD AUTO: 1.54 10*3/MM3 (ref 0.6–4.2)
LYMPHOCYTES # BLD AUTO: 1.75 10*3/MM3 (ref 0.6–4.2)
LYMPHOCYTES # BLD AUTO: 1.8 10*3/MM3 (ref 0.6–4.2)
LYMPHOCYTES # BLD AUTO: 1.87 10*3/MM3 (ref 0.6–4.2)
LYMPHOCYTES # BLD AUTO: 2.14 10*3/MM3 (ref 0.6–4.2)
LYMPHOCYTES # BLD AUTO: 2.21 10*3/MM3 (ref 0.6–4.2)
LYMPHOCYTES # BLD MANUAL: 2.77 10*3/MM3 (ref 0.6–4.2)
LYMPHOCYTES # BLD MANUAL: 9.98 10*3/MM3 (ref 0.6–4.2)
LYMPHOCYTES NFR BLD AUTO: 10.1 % (ref 10–50)
LYMPHOCYTES NFR BLD AUTO: 10.9 % (ref 10–50)
LYMPHOCYTES NFR BLD AUTO: 11.5 % (ref 10–50)
LYMPHOCYTES NFR BLD AUTO: 12 % (ref 10–50)
LYMPHOCYTES NFR BLD AUTO: 17.5 % (ref 10–50)
LYMPHOCYTES NFR BLD AUTO: 18.3 % (ref 10–50)
LYMPHOCYTES NFR BLD AUTO: 20.4 % (ref 10–50)
LYMPHOCYTES NFR BLD AUTO: 20.6 % (ref 10–50)
LYMPHOCYTES NFR BLD AUTO: 22.8 % (ref 10–50)
LYMPHOCYTES NFR BLD AUTO: 24.2 % (ref 10–50)
LYMPHOCYTES NFR BLD AUTO: 9.6 % (ref 10–50)
LYMPHOCYTES NFR BLD MANUAL: 3 % (ref 0–12)
LYMPHOCYTES NFR BLD MANUAL: 38 % (ref 10–50)
LYMPHOCYTES NFR BLD MANUAL: 72 % (ref 10–50)
Lab: ABNORMAL
MACROCYTES BLD QL SMEAR: ABNORMAL
MCH RBC QN AUTO: 31.3 PG (ref 26.5–34)
MCH RBC QN AUTO: 31.5 PG (ref 26.5–34)
MCH RBC QN AUTO: 31.6 PG (ref 26.5–34)
MCH RBC QN AUTO: 31.6 PG (ref 26.5–34)
MCH RBC QN AUTO: 31.9 PG (ref 26.5–34)
MCH RBC QN AUTO: 32 PG (ref 26.5–34)
MCH RBC QN AUTO: 32 PG (ref 26.5–34)
MCH RBC QN AUTO: 32.2 PG (ref 26.5–34)
MCH RBC QN AUTO: 32.3 PG (ref 26.5–34)
MCH RBC QN AUTO: 32.5 PG (ref 26.5–34)
MCH RBC QN AUTO: 32.7 PG (ref 26.5–34)
MCHC RBC AUTO-ENTMCNC: 29.7 G/DL (ref 31.5–36.3)
MCHC RBC AUTO-ENTMCNC: 32.6 G/DL (ref 31.5–36.3)
MCHC RBC AUTO-ENTMCNC: 33.2 G/DL (ref 31.5–36.3)
MCHC RBC AUTO-ENTMCNC: 33.3 G/DL (ref 31.5–36.3)
MCHC RBC AUTO-ENTMCNC: 33.5 G/DL (ref 31.5–36.3)
MCHC RBC AUTO-ENTMCNC: 33.6 G/DL (ref 31.5–36.3)
MCHC RBC AUTO-ENTMCNC: 33.7 G/DL (ref 31.5–36.3)
MCHC RBC AUTO-ENTMCNC: 33.8 G/DL (ref 31.5–36.3)
MCHC RBC AUTO-ENTMCNC: 34 G/DL (ref 31.5–36.3)
MCHC RBC AUTO-ENTMCNC: 34.3 G/DL (ref 31.5–36.3)
MCHC RBC AUTO-ENTMCNC: 34.5 G/DL (ref 31.5–36.3)
MCV RBC AUTO: 106 FL (ref 80–98)
MCV RBC AUTO: 93.5 FL (ref 80–98)
MCV RBC AUTO: 93.9 FL (ref 80–98)
MCV RBC AUTO: 94 FL (ref 80–98)
MCV RBC AUTO: 94.7 FL (ref 80–98)
MCV RBC AUTO: 94.9 FL (ref 80–98)
MCV RBC AUTO: 95.1 FL (ref 80–98)
MCV RBC AUTO: 95.4 FL (ref 80–98)
MCV RBC AUTO: 95.5 FL (ref 80–98)
MCV RBC AUTO: 95.6 FL (ref 80–98)
MCV RBC AUTO: 96 FL (ref 80–98)
MCV RBC AUTO: 96.1 FL (ref 80–98)
MCV RBC AUTO: 97.7 FL (ref 80–98)
MODALITY: ABNORMAL
MONOCYTES # BLD AUTO: 0.22 10*3/MM3 (ref 0–0.9)
MONOCYTES # BLD AUTO: 0.43 10*3/MM3 (ref 0–0.9)
MONOCYTES # BLD AUTO: 0.46 10*3/MM3 (ref 0–0.9)
MONOCYTES # BLD AUTO: 0.46 10*3/MM3 (ref 0–0.9)
MONOCYTES # BLD AUTO: 0.52 10*3/MM3 (ref 0–0.9)
MONOCYTES # BLD AUTO: 0.67 10*3/MM3 (ref 0–0.9)
MONOCYTES # BLD AUTO: 0.68 10*3/MM3 (ref 0–0.9)
MONOCYTES # BLD AUTO: 0.73 10*3/MM3 (ref 0–0.9)
MONOCYTES # BLD AUTO: 0.77 10*3/MM3 (ref 0–0.9)
MONOCYTES # BLD AUTO: 0.77 10*3/MM3 (ref 0–0.9)
MONOCYTES # BLD AUTO: 1.03 10*3/MM3 (ref 0–0.9)
MONOCYTES # BLD AUTO: 1.28 10*3/MM3 (ref 0–0.9)
MONOCYTES NFR BLD AUTO: 11.8 % (ref 0–12)
MONOCYTES NFR BLD AUTO: 13.2 % (ref 0–12)
MONOCYTES NFR BLD AUTO: 5 % (ref 0–12)
MONOCYTES NFR BLD AUTO: 5.1 % (ref 0–12)
MONOCYTES NFR BLD AUTO: 5.2 % (ref 0–12)
MONOCYTES NFR BLD AUTO: 6.1 % (ref 0–12)
MONOCYTES NFR BLD AUTO: 7.3 % (ref 0–12)
MONOCYTES NFR BLD AUTO: 8 % (ref 0–12)
MONOCYTES NFR BLD AUTO: 8 % (ref 0–12)
MONOCYTES NFR BLD AUTO: 8.3 % (ref 0–12)
MONOCYTES NFR BLD AUTO: 8.7 % (ref 0–12)
NEUTROPHILS # BLD AUTO: 2.92 10*3/MM3 (ref 2–8.6)
NEUTROPHILS # BLD AUTO: 3.47 10*3/MM3 (ref 2–8.6)
NEUTROPHILS # BLD AUTO: 5.02 10*3/MM3 (ref 2–8.6)
NEUTROPHILS # BLD AUTO: 5.64 10*3/MM3 (ref 2–8.6)
NEUTROPHILS # BLD AUTO: 5.92 10*3/MM3 (ref 2–8.6)
NEUTROPHILS # BLD AUTO: 6.14 10*3/MM3 (ref 2–8.6)
NEUTROPHILS # BLD AUTO: 6.33 10*3/MM3 (ref 2–8.6)
NEUTROPHILS # BLD AUTO: 6.77 10*3/MM3 (ref 2–8.6)
NEUTROPHILS # BLD AUTO: 6.86 10*3/MM3 (ref 2–8.6)
NEUTROPHILS # BLD AUTO: 7.16 10*3/MM3 (ref 2–8.6)
NEUTROPHILS # BLD AUTO: 7.34 10*3/MM3 (ref 2–8.6)
NEUTROPHILS # BLD AUTO: 7.45 10*3/MM3 (ref 2–8.6)
NEUTROPHILS # BLD AUTO: 8.74 10*3/MM3 (ref 2–8.6)
NEUTROPHILS NFR BLD AUTO: 61 % (ref 37–80)
NEUTROPHILS NFR BLD AUTO: 64.7 % (ref 37–80)
NEUTROPHILS NFR BLD AUTO: 64.9 % (ref 37–80)
NEUTROPHILS NFR BLD AUTO: 70.8 % (ref 37–80)
NEUTROPHILS NFR BLD AUTO: 70.8 % (ref 37–80)
NEUTROPHILS NFR BLD AUTO: 71.7 % (ref 37–80)
NEUTROPHILS NFR BLD AUTO: 80.4 % (ref 37–80)
NEUTROPHILS NFR BLD AUTO: 81.6 % (ref 37–80)
NEUTROPHILS NFR BLD AUTO: 82.8 % (ref 37–80)
NEUTROPHILS NFR BLD AUTO: 84.1 % (ref 37–80)
NEUTROPHILS NFR BLD AUTO: 85.1 % (ref 37–80)
NEUTROPHILS NFR BLD MANUAL: 19 % (ref 37–80)
NEUTROPHILS NFR BLD MANUAL: 40 % (ref 37–80)
NEUTS BAND NFR BLD MANUAL: 6 % (ref 0–5)
NITRITE UR QL STRIP: NEGATIVE
NITRITE UR QL STRIP: NEGATIVE
NRBC BLD MANUAL-RTO: 0 /100 WBC (ref 0–0)
PATH REPORT.FINAL DX SPEC: NORMAL
PATH REPORT.GROSS SPEC: NORMAL
PCO2 BLDA: 89.1 MM HG (ref 35–45)
PH BLDA: 7.03 PH UNITS (ref 7.35–7.45)
PH UR STRIP.AUTO: 5.5 [PH] (ref 5–9)
PH UR STRIP.AUTO: <=5 [PH] (ref 5–9)
PLATELET # BLD AUTO: 189 10*3/MM3 (ref 150–450)
PLATELET # BLD AUTO: 223 10*3/MM3 (ref 150–450)
PLATELET # BLD AUTO: 229 10*3/MM3 (ref 150–450)
PLATELET # BLD AUTO: 233 10*3/MM3 (ref 150–450)
PLATELET # BLD AUTO: 253 10*3/MM3 (ref 150–450)
PLATELET # BLD AUTO: 257 10*3/MM3 (ref 150–450)
PLATELET # BLD AUTO: 259 10*3/MM3 (ref 150–450)
PLATELET # BLD AUTO: 262 10*3/MM3 (ref 150–450)
PLATELET # BLD AUTO: 271 10*3/MM3 (ref 150–450)
PLATELET # BLD AUTO: 273 10*3/MM3 (ref 150–450)
PLATELET # BLD AUTO: 279 10*3/MM3 (ref 150–450)
PLATELET # BLD AUTO: 287 10*3/MM3 (ref 150–450)
PLATELET # BLD AUTO: 307 10*3/MM3 (ref 150–450)
PMV BLD AUTO: 10 FL (ref 8–12)
PMV BLD AUTO: 10.2 FL (ref 8–12)
PMV BLD AUTO: 10.8 FL (ref 8–12)
PMV BLD AUTO: 9.6 FL (ref 8–12)
PMV BLD AUTO: 9.7 FL (ref 8–12)
PO2 BLDA: 75.9 MM HG (ref 83–108)
POLYCHROMASIA BLD QL SMEAR: ABNORMAL
POTASSIUM BLD-SCNC: 3.3 MMOL/L (ref 3.5–5.1)
POTASSIUM BLD-SCNC: 3.4 MMOL/L (ref 3.5–5.1)
POTASSIUM BLD-SCNC: 3.5 MMOL/L (ref 3.5–5.1)
POTASSIUM BLD-SCNC: 3.6 MMOL/L (ref 3.5–5.1)
POTASSIUM BLD-SCNC: 3.7 MMOL/L (ref 3.5–5.1)
POTASSIUM BLD-SCNC: 3.9 MMOL/L (ref 3.5–5.1)
POTASSIUM BLD-SCNC: 4 MMOL/L (ref 3.5–5.1)
POTASSIUM BLD-SCNC: 4 MMOL/L (ref 3.5–5.1)
POTASSIUM BLD-SCNC: 4.3 MMOL/L (ref 3.5–5.1)
POTASSIUM BLD-SCNC: 4.5 MMOL/L (ref 3.5–5.1)
PROT SERPL-MCNC: 6.5 G/DL (ref 6.3–8.6)
PROT SERPL-MCNC: 8 G/DL (ref 6.3–8.6)
PROT UR QL STRIP: NEGATIVE
PROT UR QL STRIP: NEGATIVE
PROTHROMBIN TIME: 17.2 SECONDS (ref 11.1–15.3)
RBC # BLD AUTO: 2.37 10*6/MM3 (ref 4.37–5.74)
RBC # BLD AUTO: 2.45 10*6/MM3 (ref 4.37–5.74)
RBC # BLD AUTO: 2.47 10*6/MM3 (ref 4.37–5.74)
RBC # BLD AUTO: 2.47 10*6/MM3 (ref 4.37–5.74)
RBC # BLD AUTO: 2.52 10*6/MM3 (ref 4.37–5.74)
RBC # BLD AUTO: 2.63 10*6/MM3 (ref 4.37–5.74)
RBC # BLD AUTO: 2.64 10*6/MM3 (ref 4.37–5.74)
RBC # BLD AUTO: 2.76 10*6/MM3 (ref 4.37–5.74)
RBC # BLD AUTO: 2.81 10*6/MM3 (ref 4.37–5.74)
RBC # BLD AUTO: 2.94 10*6/MM3 (ref 4.37–5.74)
RBC # BLD AUTO: 2.98 10*6/MM3 (ref 4.37–5.74)
RBC # BLD AUTO: 3.2 10*6/MM3 (ref 4.37–5.74)
RBC # BLD AUTO: 3.45 10*6/MM3 (ref 4.37–5.74)
RBC # UR: ABNORMAL /HPF
RBC # UR: ABNORMAL /HPF
REF LAB TEST METHOD: ABNORMAL
REF LAB TEST METHOD: ABNORMAL
RETICS #: 0.03 10*6/MM3 (ref 0.03–0.12)
RETICS/RBC NFR AUTO: 1.15 % (ref 0.64–2.26)
RETICULOCYTE PRODUCTION INDEX: 0.32 % (ref 0.64–2.26)
SAO2 % BLDCOA: 84.6 % (ref 94–99)
SMALL PLATELETS BLD QL SMEAR: ADEQUATE
SMALL PLATELETS BLD QL SMEAR: ADEQUATE
SODIUM BLD-SCNC: 137 MMOL/L (ref 137–145)
SODIUM BLD-SCNC: 138 MMOL/L (ref 137–145)
SODIUM BLD-SCNC: 139 MMOL/L (ref 137–145)
SODIUM BLD-SCNC: 140 MMOL/L (ref 137–145)
SODIUM BLD-SCNC: 141 MMOL/L (ref 137–145)
SODIUM BLD-SCNC: 144 MMOL/L (ref 137–145)
SP GR UR STRIP: 1.01 (ref 1–1.03)
SP GR UR STRIP: 1.02 (ref 1–1.03)
SQUAMOUS #/AREA URNS HPF: ABNORMAL /HPF
SQUAMOUS #/AREA URNS HPF: ABNORMAL /HPF
TIBC SERPL-MCNC: 190 MCG/DL (ref 261–462)
TROPONIN I SERPL-MCNC: 0.06 NG/ML
URATE SERPL-MCNC: 7.7 MG/DL (ref 2.5–8.5)
UROBILINOGEN UR QL STRIP: ABNORMAL
UROBILINOGEN UR QL STRIP: ABNORMAL
VARIANT LYMPHS NFR BLD MANUAL: 2 % (ref 0–5)
VENTILATOR MODE: ABNORMAL
VIT B12 BLD-MCNC: 437 PG/ML (ref 239–931)
WBC MORPH BLD: NORMAL
WBC MORPH BLD: NORMAL
WBC NRBC COR # BLD: 10.26 10*3/MM3 (ref 3.2–9.8)
WBC NRBC COR # BLD: 10.51 10*3/MM3 (ref 3.2–9.8)
WBC NRBC COR # BLD: 13.86 10*3/MM3 (ref 3.2–9.8)
WBC NRBC COR # BLD: 7.29 10*3/MM3 (ref 3.2–9.8)
WBC NRBC COR # BLD: 7.52 10*3/MM3 (ref 3.2–9.8)
WBC NRBC COR # BLD: 7.73 10*3/MM3 (ref 3.2–9.8)
WBC NRBC COR # BLD: 8.52 10*3/MM3 (ref 3.2–9.8)
WBC NRBC COR # BLD: 8.53 10*3/MM3 (ref 3.2–9.8)
WBC NRBC COR # BLD: 8.73 10*3/MM3 (ref 3.2–9.8)
WBC NRBC COR # BLD: 8.82 10*3/MM3 (ref 3.2–9.8)
WBC NRBC COR # BLD: 8.86 10*3/MM3 (ref 3.2–9.8)
WBC NRBC COR # BLD: 9.57 10*3/MM3 (ref 3.2–9.8)
WBC NRBC COR # BLD: 9.7 10*3/MM3 (ref 3.2–9.8)
WBC UR QL AUTO: ABNORMAL /HPF
WBC UR QL AUTO: ABNORMAL /HPF
WHOLE BLOOD HOLD SPECIMEN: NORMAL
WHOLE BLOOD HOLD SPECIMEN: NORMAL

## 2018-01-01 PROCEDURE — 80048 BASIC METABOLIC PNL TOTAL CA: CPT | Performed by: NURSE PRACTITIONER

## 2018-01-01 PROCEDURE — 99222 1ST HOSP IP/OBS MODERATE 55: CPT | Performed by: PODIATRIST

## 2018-01-01 PROCEDURE — 31500 INSERT EMERGENCY AIRWAY: CPT

## 2018-01-01 PROCEDURE — 82607 VITAMIN B-12: CPT | Performed by: NURSE PRACTITIONER

## 2018-01-01 PROCEDURE — 20600 DRAIN/INJ JOINT/BURSA W/O US: CPT | Performed by: PODIATRIST

## 2018-01-01 PROCEDURE — 99213 OFFICE O/P EST LOW 20 MIN: CPT | Performed by: GENERAL PRACTICE

## 2018-01-01 PROCEDURE — 93294 REM INTERROG EVL PM/LDLS PM: CPT | Performed by: NURSE PRACTITIONER

## 2018-01-01 PROCEDURE — 80048 BASIC METABOLIC PNL TOTAL CA: CPT | Performed by: GENERAL PRACTICE

## 2018-01-01 PROCEDURE — 85025 COMPLETE CBC W/AUTO DIFF WBC: CPT | Performed by: NURSE PRACTITIONER

## 2018-01-01 PROCEDURE — 99203 OFFICE O/P NEW LOW 30 MIN: CPT | Performed by: SURGERY

## 2018-01-01 PROCEDURE — 25010000002 ENOXAPARIN PER 10 MG: Performed by: NURSE PRACTITIONER

## 2018-01-01 PROCEDURE — 99285 EMERGENCY DEPT VISIT HI MDM: CPT | Performed by: INTERNAL MEDICINE

## 2018-01-01 PROCEDURE — 25010000002 VANCOMYCIN: Performed by: EMERGENCY MEDICINE

## 2018-01-01 PROCEDURE — 87086 URINE CULTURE/COLONY COUNT: CPT | Performed by: NURSE PRACTITIONER

## 2018-01-01 PROCEDURE — 97535 SELF CARE MNGMENT TRAINING: CPT

## 2018-01-01 PROCEDURE — 97530 THERAPEUTIC ACTIVITIES: CPT

## 2018-01-01 PROCEDURE — 84550 ASSAY OF BLOOD/URIC ACID: CPT | Performed by: NURSE PRACTITIONER

## 2018-01-01 PROCEDURE — 82746 ASSAY OF FOLIC ACID SERUM: CPT | Performed by: NURSE PRACTITIONER

## 2018-01-01 PROCEDURE — 85730 THROMBOPLASTIN TIME PARTIAL: CPT | Performed by: FAMILY MEDICINE

## 2018-01-01 PROCEDURE — 93454 CORONARY ARTERY ANGIO S&I: CPT | Performed by: INTERNAL MEDICINE

## 2018-01-01 PROCEDURE — 97116 GAIT TRAINING THERAPY: CPT

## 2018-01-01 PROCEDURE — 85007 BL SMEAR W/DIFF WBC COUNT: CPT | Performed by: FAMILY MEDICINE

## 2018-01-01 PROCEDURE — 73630 X-RAY EXAM OF FOOT: CPT

## 2018-01-01 PROCEDURE — 99213 OFFICE O/P EST LOW 20 MIN: CPT | Performed by: FAMILY MEDICINE

## 2018-01-01 PROCEDURE — 83550 IRON BINDING TEST: CPT | Performed by: NURSE PRACTITIONER

## 2018-01-01 PROCEDURE — 63710000001 PREDNISONE PER 1 MG: Performed by: NURSE PRACTITIONER

## 2018-01-01 PROCEDURE — 36415 COLL VENOUS BLD VENIPUNCTURE: CPT | Performed by: GENERAL PRACTICE

## 2018-01-01 PROCEDURE — 80053 COMPREHEN METABOLIC PANEL: CPT | Performed by: EMERGENCY MEDICINE

## 2018-01-01 PROCEDURE — 25010000002 EPINEPHRINE PF 1 MG/10ML SOLUTION PREFILLED SYRINGE: Performed by: FAMILY MEDICINE

## 2018-01-01 PROCEDURE — 97110 THERAPEUTIC EXERCISES: CPT

## 2018-01-01 PROCEDURE — 87070 CULTURE OTHR SPECIMN AEROBIC: CPT | Performed by: PODIATRIST

## 2018-01-01 PROCEDURE — 88305 TISSUE EXAM BY PATHOLOGIST: CPT | Performed by: PATHOLOGY

## 2018-01-01 PROCEDURE — 85610 PROTHROMBIN TIME: CPT | Performed by: FAMILY MEDICINE

## 2018-01-01 PROCEDURE — 93296 REM INTERROG EVL PM/IDS: CPT | Performed by: NURSE PRACTITIONER

## 2018-01-01 PROCEDURE — 43239 EGD BIOPSY SINGLE/MULTIPLE: CPT | Performed by: INTERNAL MEDICINE

## 2018-01-01 PROCEDURE — 25010000002 CEFTRIAXONE PER 250 MG: Performed by: NURSE PRACTITIONER

## 2018-01-01 PROCEDURE — 85651 RBC SED RATE NONAUTOMATED: CPT | Performed by: EMERGENCY MEDICINE

## 2018-01-01 PROCEDURE — 88342 IMHCHEM/IMCYTCHM 1ST ANTB: CPT | Performed by: PATHOLOGY

## 2018-01-01 PROCEDURE — 99285 EMERGENCY DEPT VISIT HI MDM: CPT

## 2018-01-01 PROCEDURE — 87040 BLOOD CULTURE FOR BACTERIA: CPT | Performed by: NURSE PRACTITIONER

## 2018-01-01 PROCEDURE — 84550 ASSAY OF BLOOD/URIC ACID: CPT | Performed by: FAMILY MEDICINE

## 2018-01-01 PROCEDURE — 82306 VITAMIN D 25 HYDROXY: CPT

## 2018-01-01 PROCEDURE — 99213 OFFICE O/P EST LOW 20 MIN: CPT | Performed by: INTERNAL MEDICINE

## 2018-01-01 PROCEDURE — 82962 GLUCOSE BLOOD TEST: CPT

## 2018-01-01 PROCEDURE — 82803 BLOOD GASES ANY COMBINATION: CPT

## 2018-01-01 PROCEDURE — 82728 ASSAY OF FERRITIN: CPT | Performed by: NURSE PRACTITIONER

## 2018-01-01 PROCEDURE — 83540 ASSAY OF IRON: CPT | Performed by: NURSE PRACTITIONER

## 2018-01-01 PROCEDURE — 81001 URINALYSIS AUTO W/SCOPE: CPT | Performed by: NURSE PRACTITIONER

## 2018-01-01 PROCEDURE — 25010000002 PIPERACILLIN-TAZOBACTAM: Performed by: EMERGENCY MEDICINE

## 2018-01-01 PROCEDURE — 86140 C-REACTIVE PROTEIN: CPT | Performed by: NURSE PRACTITIONER

## 2018-01-01 PROCEDURE — 88305 TISSUE EXAM BY PATHOLOGIST: CPT | Performed by: INTERNAL MEDICINE

## 2018-01-01 PROCEDURE — 90662 IIV NO PRSV INCREASED AG IM: CPT | Performed by: GENERAL PRACTICE

## 2018-01-01 PROCEDURE — G0378 HOSPITAL OBSERVATION PER HR: HCPCS

## 2018-01-01 PROCEDURE — 25010000002 PROPOFOL 10 MG/ML EMULSION: Performed by: NURSE ANESTHETIST, CERTIFIED REGISTERED

## 2018-01-01 PROCEDURE — 93288 INTERROG EVL PM/LDLS PM IP: CPT | Performed by: NURSE PRACTITIONER

## 2018-01-01 PROCEDURE — 97167 OT EVAL HIGH COMPLEX 60 MIN: CPT

## 2018-01-01 PROCEDURE — G8987 SELF CARE CURRENT STATUS: HCPCS

## 2018-01-01 PROCEDURE — G8978 MOBILITY CURRENT STATUS: HCPCS

## 2018-01-01 PROCEDURE — 73700 CT LOWER EXTREMITY W/O DYE: CPT

## 2018-01-01 PROCEDURE — 93010 ELECTROCARDIOGRAM REPORT: CPT | Performed by: INTERNAL MEDICINE

## 2018-01-01 PROCEDURE — 84484 ASSAY OF TROPONIN QUANT: CPT | Performed by: FAMILY MEDICINE

## 2018-01-01 PROCEDURE — 82272 OCCULT BLD FECES 1-3 TESTS: CPT | Performed by: NURSE PRACTITIONER

## 2018-01-01 PROCEDURE — 93971 EXTREMITY STUDY: CPT

## 2018-01-01 PROCEDURE — 88342 IMHCHEM/IMCYTCHM 1ST ANTB: CPT | Performed by: INTERNAL MEDICINE

## 2018-01-01 PROCEDURE — G8979 MOBILITY GOAL STATUS: HCPCS

## 2018-01-01 PROCEDURE — 97162 PT EVAL MOD COMPLEX 30 MIN: CPT

## 2018-01-01 PROCEDURE — 99284 EMERGENCY DEPT VISIT MOD MDM: CPT

## 2018-01-01 PROCEDURE — 89060 EXAM SYNOVIAL FLUID CRYSTALS: CPT | Performed by: PODIATRIST

## 2018-01-01 PROCEDURE — 0DB68ZX EXCISION OF STOMACH, VIA NATURAL OR ARTIFICIAL OPENING ENDOSCOPIC, DIAGNOSTIC: ICD-10-PCS | Performed by: INTERNAL MEDICINE

## 2018-01-01 PROCEDURE — 86140 C-REACTIVE PROTEIN: CPT | Performed by: EMERGENCY MEDICINE

## 2018-01-01 PROCEDURE — 85007 BL SMEAR W/DIFF WBC COUNT: CPT | Performed by: GENERAL PRACTICE

## 2018-01-01 PROCEDURE — 85025 COMPLETE CBC W/AUTO DIFF WBC: CPT | Performed by: EMERGENCY MEDICINE

## 2018-01-01 PROCEDURE — 25010000002 LEVOFLOXACIN PER 250 MG: Performed by: NURSE PRACTITIONER

## 2018-01-01 PROCEDURE — 80053 COMPREHEN METABOLIC PANEL: CPT | Performed by: FAMILY MEDICINE

## 2018-01-01 PROCEDURE — 36600 WITHDRAWAL OF ARTERIAL BLOOD: CPT

## 2018-01-01 PROCEDURE — 99214 OFFICE O/P EST MOD 30 MIN: CPT | Performed by: GENERAL PRACTICE

## 2018-01-01 PROCEDURE — 85025 COMPLETE CBC W/AUTO DIFF WBC: CPT | Performed by: GENERAL PRACTICE

## 2018-01-01 PROCEDURE — 99213 OFFICE O/P EST LOW 20 MIN: CPT | Performed by: PODIATRIST

## 2018-01-01 PROCEDURE — C1894 INTRO/SHEATH, NON-LASER: HCPCS | Performed by: INTERNAL MEDICINE

## 2018-01-01 PROCEDURE — G0008 ADMIN INFLUENZA VIRUS VAC: HCPCS | Performed by: GENERAL PRACTICE

## 2018-01-01 PROCEDURE — 87205 SMEAR GRAM STAIN: CPT | Performed by: PODIATRIST

## 2018-01-01 PROCEDURE — 85046 RETICYTE/HGB CONCENTRATE: CPT | Performed by: NURSE PRACTITIONER

## 2018-01-01 PROCEDURE — 93005 ELECTROCARDIOGRAM TRACING: CPT | Performed by: FAMILY MEDICINE

## 2018-01-01 PROCEDURE — 81001 URINALYSIS AUTO W/SCOPE: CPT | Performed by: EMERGENCY MEDICINE

## 2018-01-01 PROCEDURE — G8988 SELF CARE GOAL STATUS: HCPCS

## 2018-01-01 PROCEDURE — C1769 GUIDE WIRE: HCPCS | Performed by: INTERNAL MEDICINE

## 2018-01-01 PROCEDURE — 25010000002 DEXAMETHASONE PER 1 MG: Performed by: NURSE PRACTITIONER

## 2018-01-01 PROCEDURE — 71046 X-RAY EXAM CHEST 2 VIEWS: CPT

## 2018-01-01 PROCEDURE — 92950 HEART/LUNG RESUSCITATION CPR: CPT

## 2018-01-01 PROCEDURE — 99232 SBSQ HOSP IP/OBS MODERATE 35: CPT | Performed by: INTERNAL MEDICINE

## 2018-01-01 PROCEDURE — 85025 COMPLETE CBC W/AUTO DIFF WBC: CPT | Performed by: FAMILY MEDICINE

## 2018-01-01 PROCEDURE — 87015 SPECIMEN INFECT AGNT CONCNTJ: CPT | Performed by: PODIATRIST

## 2018-01-01 RX ORDER — DEXAMETHASONE SODIUM PHOSPHATE 10 MG/ML
8 INJECTION INTRAMUSCULAR; INTRAVENOUS ONCE
Status: COMPLETED | OUTPATIENT
Start: 2018-01-01 | End: 2018-01-01

## 2018-01-01 RX ORDER — FERROUS SULFATE 325(65) MG
325 TABLET ORAL 2 TIMES DAILY
Qty: 180 TABLET | Refills: 2 | Status: SHIPPED | OUTPATIENT
Start: 2018-01-01

## 2018-01-01 RX ORDER — HYDROCODONE BITARTRATE AND ACETAMINOPHEN 5; 325 MG/1; MG/1
1 TABLET ORAL EVERY 6 HOURS PRN
Status: DISCONTINUED | OUTPATIENT
Start: 2018-01-01 | End: 2018-01-01 | Stop reason: HOSPADM

## 2018-01-01 RX ORDER — DEXTROSE AND SODIUM CHLORIDE 5; .45 G/100ML; G/100ML
30 INJECTION, SOLUTION INTRAVENOUS CONTINUOUS PRN
Status: CANCELLED | OUTPATIENT
Start: 2018-01-01

## 2018-01-01 RX ORDER — COLCHICINE 0.6 MG/1
0.6 TABLET ORAL ONCE
Status: COMPLETED | OUTPATIENT
Start: 2018-01-01 | End: 2018-01-01

## 2018-01-01 RX ORDER — PANTOPRAZOLE SODIUM 40 MG/1
40 TABLET, DELAYED RELEASE ORAL
Status: DISCONTINUED | OUTPATIENT
Start: 2018-01-01 | End: 2018-01-01 | Stop reason: HOSPADM

## 2018-01-01 RX ORDER — FUROSEMIDE 40 MG/1
TABLET ORAL
Qty: 90 TABLET | Refills: 3 | Status: SHIPPED | OUTPATIENT
Start: 2018-01-01

## 2018-01-01 RX ORDER — SODIUM CHLORIDE 9 MG/ML
100 INJECTION, SOLUTION INTRAVENOUS CONTINUOUS
Status: DISCONTINUED | OUTPATIENT
Start: 2018-01-01 | End: 2018-01-01

## 2018-01-01 RX ORDER — PROPOFOL 10 MG/ML
VIAL (ML) INTRAVENOUS AS NEEDED
Status: DISCONTINUED | OUTPATIENT
Start: 2018-01-01 | End: 2018-01-01

## 2018-01-01 RX ORDER — BRIMONIDINE TARTRATE AND TIMOLOL MALEATE 2; 5 MG/ML; MG/ML
1 SOLUTION OPHTHALMIC EVERY 12 HOURS
COMMUNITY

## 2018-01-01 RX ORDER — SODIUM CHLORIDE 0.9 % (FLUSH) 0.9 %
1-10 SYRINGE (ML) INJECTION AS NEEDED
Status: DISCONTINUED | OUTPATIENT
Start: 2018-01-01 | End: 2018-01-01 | Stop reason: HOSPADM

## 2018-01-01 RX ORDER — LABETALOL HYDROCHLORIDE 5 MG/ML
10 INJECTION, SOLUTION INTRAVENOUS EVERY 6 HOURS PRN
Status: DISCONTINUED | OUTPATIENT
Start: 2018-01-01 | End: 2018-01-01 | Stop reason: HOSPADM

## 2018-01-01 RX ORDER — LIDOCAINE HYDROCHLORIDE 20 MG/ML
INJECTION, SOLUTION INFILTRATION; PERINEURAL AS NEEDED
Status: DISCONTINUED | OUTPATIENT
Start: 2018-01-01 | End: 2018-01-01 | Stop reason: HOSPADM

## 2018-01-01 RX ORDER — FUROSEMIDE 40 MG/1
40 TABLET ORAL DAILY
Status: DISCONTINUED | OUTPATIENT
Start: 2018-01-01 | End: 2018-01-01 | Stop reason: HOSPADM

## 2018-01-01 RX ORDER — FAMOTIDINE 40 MG/1
40 TABLET, FILM COATED ORAL DAILY
Status: DISCONTINUED | OUTPATIENT
Start: 2018-01-01 | End: 2018-01-01 | Stop reason: HOSPADM

## 2018-01-01 RX ORDER — PANTOPRAZOLE SODIUM 40 MG/1
40 TABLET, DELAYED RELEASE ORAL DAILY
Qty: 30 TABLET | Refills: 0 | Status: SHIPPED | OUTPATIENT
Start: 2018-01-01

## 2018-01-01 RX ORDER — LATANOPROST 50 UG/ML
1 SOLUTION/ DROPS OPHTHALMIC NIGHTLY
Status: DISCONTINUED | OUTPATIENT
Start: 2018-01-01 | End: 2018-01-01 | Stop reason: HOSPADM

## 2018-01-01 RX ORDER — ACETAMINOPHEN 325 MG/1
650 TABLET ORAL EVERY 4 HOURS PRN
Status: DISCONTINUED | OUTPATIENT
Start: 2018-01-01 | End: 2018-01-01 | Stop reason: HOSPADM

## 2018-01-01 RX ORDER — TRAMADOL HYDROCHLORIDE 50 MG/1
50 TABLET ORAL EVERY 6 HOURS PRN
Status: DISCONTINUED | OUTPATIENT
Start: 2018-01-01 | End: 2018-01-01

## 2018-01-01 RX ORDER — PROPOFOL 10 MG/ML
VIAL (ML) INTRAVENOUS AS NEEDED
Status: DISCONTINUED | OUTPATIENT
Start: 2018-01-01 | End: 2018-01-01 | Stop reason: SURG

## 2018-01-01 RX ORDER — LIDOCAINE HYDROCHLORIDE 10 MG/ML
INJECTION, SOLUTION INFILTRATION; PERINEURAL AS NEEDED
Status: DISCONTINUED | OUTPATIENT
Start: 2018-01-01 | End: 2018-01-01 | Stop reason: SURG

## 2018-01-01 RX ORDER — CEPHALEXIN 500 MG/1
500 CAPSULE ORAL 3 TIMES DAILY
Qty: 21 CAPSULE | Refills: 0 | Status: SHIPPED | OUTPATIENT
Start: 2018-01-01 | End: 2018-01-01

## 2018-01-01 RX ORDER — TRAMADOL HYDROCHLORIDE 50 MG/1
50 TABLET ORAL EVERY 6 HOURS PRN
Status: ON HOLD | COMMUNITY
End: 2018-01-01

## 2018-01-01 RX ORDER — SPIRONOLACTONE 25 MG/1
TABLET ORAL
Qty: 45 TABLET | Refills: 3 | Status: SHIPPED | OUTPATIENT
Start: 2018-01-01

## 2018-01-01 RX ORDER — LINEZOLID 100 MG/5ML
600 SUSPENSION ORAL EVERY 12 HOURS SCHEDULED
Status: COMPLETED | OUTPATIENT
Start: 2018-01-01 | End: 2018-01-01

## 2018-01-01 RX ORDER — ATORVASTATIN CALCIUM 40 MG/1
40 TABLET, FILM COATED ORAL NIGHTLY
Qty: 90 TABLET | Refills: 3 | Status: SHIPPED | OUTPATIENT
Start: 2018-01-01

## 2018-01-01 RX ORDER — LINEZOLID 600 MG/1
600 TABLET, FILM COATED ORAL EVERY 12 HOURS SCHEDULED
Status: DISCONTINUED | OUTPATIENT
Start: 2018-01-01 | End: 2018-01-01

## 2018-01-01 RX ORDER — TRAMADOL HYDROCHLORIDE 50 MG/1
50 TABLET ORAL EVERY 6 HOURS PRN
Qty: 12 TABLET | Refills: 0 | Status: SHIPPED | OUTPATIENT
Start: 2018-01-01

## 2018-01-01 RX ORDER — SODIUM CHLORIDE 0.9 % (FLUSH) 0.9 %
10 SYRINGE (ML) INJECTION AS NEEDED
Status: DISCONTINUED | OUTPATIENT
Start: 2018-01-01 | End: 2018-01-01 | Stop reason: HOSPADM

## 2018-01-01 RX ORDER — TRAMADOL HYDROCHLORIDE 50 MG/1
50 TABLET ORAL EVERY 6 HOURS PRN
Qty: 40 TABLET | Refills: 0 | Status: SHIPPED | OUTPATIENT
Start: 2018-01-01 | End: 2018-01-01 | Stop reason: SINTOL

## 2018-01-01 RX ORDER — ASPIRIN 81 MG/1
81 TABLET ORAL DAILY
Qty: 30 TABLET | Refills: 0
Start: 2018-01-01

## 2018-01-01 RX ORDER — FERROUS SULFATE TAB EC 324 MG (65 MG FE EQUIVALENT) 324 (65 FE) MG
324 TABLET DELAYED RESPONSE ORAL 2 TIMES DAILY
Status: DISCONTINUED | OUTPATIENT
Start: 2018-01-01 | End: 2018-01-01 | Stop reason: HOSPADM

## 2018-01-01 RX ORDER — LEVOFLOXACIN 5 MG/ML
750 INJECTION, SOLUTION INTRAVENOUS
Status: DISCONTINUED | OUTPATIENT
Start: 2018-01-01 | End: 2018-01-01

## 2018-01-01 RX ORDER — SODIUM CHLORIDE 0.9 % (FLUSH) 0.9 %
1-10 SYRINGE (ML) INJECTION AS NEEDED
Status: CANCELLED | OUTPATIENT
Start: 2018-01-01

## 2018-01-01 RX ORDER — ATORVASTATIN CALCIUM 40 MG/1
40 TABLET, FILM COATED ORAL NIGHTLY
Status: DISCONTINUED | OUTPATIENT
Start: 2018-01-01 | End: 2018-01-01 | Stop reason: HOSPADM

## 2018-01-01 RX ORDER — SUCRALFATE ORAL 1 G/10ML
1 SUSPENSION ORAL
Qty: 420 ML | Refills: 0 | Status: SHIPPED | OUTPATIENT
Start: 2018-01-01

## 2018-01-01 RX ORDER — COLCHICINE 0.6 MG/1
1.2 TABLET ORAL ONCE
Status: COMPLETED | OUTPATIENT
Start: 2018-01-01 | End: 2018-01-01

## 2018-01-01 RX ORDER — SODIUM CHLORIDE 9 MG/ML
30 INJECTION, SOLUTION INTRAVENOUS ONCE
Status: COMPLETED | OUTPATIENT
Start: 2018-01-01 | End: 2018-01-01

## 2018-01-01 RX ORDER — PREDNISONE 20 MG/1
40 TABLET ORAL
Status: COMPLETED | OUTPATIENT
Start: 2018-01-01 | End: 2018-01-01

## 2018-01-01 RX ORDER — DORZOLAMIDE HYDROCHLORIDE AND TIMOLOL MALEATE 20; 5 MG/ML; MG/ML
1 SOLUTION/ DROPS OPHTHALMIC 2 TIMES DAILY
Status: DISCONTINUED | OUTPATIENT
Start: 2018-01-01 | End: 2018-01-01 | Stop reason: HOSPADM

## 2018-01-01 RX ORDER — POTASSIUM CHLORIDE 750 MG/1
40 CAPSULE, EXTENDED RELEASE ORAL ONCE
Status: COMPLETED | OUTPATIENT
Start: 2018-01-01 | End: 2018-01-01

## 2018-01-01 RX ORDER — ONDANSETRON 2 MG/ML
4 INJECTION INTRAMUSCULAR; INTRAVENOUS EVERY 6 HOURS PRN
Status: DISCONTINUED | OUTPATIENT
Start: 2018-01-01 | End: 2018-01-01 | Stop reason: HOSPADM

## 2018-01-01 RX ORDER — ATORVASTATIN CALCIUM 40 MG/1
40 TABLET, FILM COATED ORAL NIGHTLY
Qty: 90 TABLET | Refills: 3 | Status: SHIPPED | OUTPATIENT
Start: 2018-01-01 | End: 2018-01-01 | Stop reason: SDUPTHER

## 2018-01-01 RX ORDER — POTASSIUM CHLORIDE 750 MG/1
20 CAPSULE, EXTENDED RELEASE ORAL ONCE
Status: COMPLETED | OUTPATIENT
Start: 2018-01-01 | End: 2018-01-01

## 2018-01-01 RX ORDER — SODIUM CHLORIDE 9 MG/ML
INJECTION, SOLUTION INTRAVENOUS CONTINUOUS PRN
Status: DISCONTINUED | OUTPATIENT
Start: 2018-01-01 | End: 2018-01-01 | Stop reason: SURG

## 2018-01-01 RX ORDER — ASPIRIN 81 MG/1
81 TABLET, CHEWABLE ORAL DAILY
Status: DISCONTINUED | OUTPATIENT
Start: 2018-01-01 | End: 2018-01-01

## 2018-01-01 RX ORDER — ERGOCALCIFEROL 1.25 MG/1
CAPSULE ORAL
Qty: 12 CAPSULE | Refills: 3 | Status: SHIPPED | OUTPATIENT
Start: 2018-01-01

## 2018-01-01 RX ADMIN — PIPERACILLIN SODIUM,TAZOBACTAM SODIUM 3.38 G: 3; .375 INJECTION, POWDER, FOR SOLUTION INTRAVENOUS at 13:40

## 2018-01-01 RX ADMIN — CEFTRIAXONE SODIUM 1 G: 1 INJECTION, POWDER, FOR SOLUTION INTRAMUSCULAR; INTRAVENOUS at 10:17

## 2018-01-01 RX ADMIN — PREDNISONE 40 MG: 20 TABLET ORAL at 10:15

## 2018-01-01 RX ADMIN — FERROUS SULFATE TAB EC 324 MG (65 MG FE EQUIVALENT) 324 MG: 324 (65 FE) TABLET DELAYED RESPONSE at 21:14

## 2018-01-01 RX ADMIN — FAMOTIDINE 40 MG: 40 TABLET ORAL at 08:47

## 2018-01-01 RX ADMIN — FERROUS SULFATE TAB EC 324 MG (65 MG FE EQUIVALENT) 324 MG: 324 (65 FE) TABLET DELAYED RESPONSE at 20:28

## 2018-01-01 RX ADMIN — FAMOTIDINE 40 MG: 40 TABLET ORAL at 09:30

## 2018-01-01 RX ADMIN — ENOXAPARIN SODIUM 30 MG: 100 INJECTION SUBCUTANEOUS at 20:29

## 2018-01-01 RX ADMIN — FUROSEMIDE 40 MG: 40 TABLET ORAL at 08:19

## 2018-01-01 RX ADMIN — FUROSEMIDE 40 MG: 40 TABLET ORAL at 08:04

## 2018-01-01 RX ADMIN — DORZOLAMIDE HYDROCHLORIDE AND TIMOLOL MALEATE 1 DROP: 20; 5 SOLUTION/ DROPS OPHTHALMIC at 20:09

## 2018-01-01 RX ADMIN — ASPIRIN 81 MG CHEWABLE TABLET 81 MG: 81 TABLET CHEWABLE at 10:16

## 2018-01-01 RX ADMIN — FERROUS SULFATE TAB EC 324 MG (65 MG FE EQUIVALENT) 324 MG: 324 (65 FE) TABLET DELAYED RESPONSE at 08:47

## 2018-01-01 RX ADMIN — FUROSEMIDE 40 MG: 40 TABLET ORAL at 09:30

## 2018-01-01 RX ADMIN — PREDNISONE 40 MG: 20 TABLET ORAL at 11:18

## 2018-01-01 RX ADMIN — DOXYCYCLINE 100 MG: 100 INJECTION, POWDER, LYOPHILIZED, FOR SOLUTION INTRAVENOUS at 19:28

## 2018-01-01 RX ADMIN — CEFTRIAXONE SODIUM 1 G: 1 INJECTION, POWDER, FOR SOLUTION INTRAMUSCULAR; INTRAVENOUS at 11:18

## 2018-01-01 RX ADMIN — FERROUS SULFATE TAB EC 324 MG (65 MG FE EQUIVALENT) 324 MG: 324 (65 FE) TABLET DELAYED RESPONSE at 23:02

## 2018-01-01 RX ADMIN — ACETAMINOPHEN 650 MG: 325 TABLET ORAL at 00:06

## 2018-01-01 RX ADMIN — LATANOPROST 1 DROP: 50 SOLUTION OPHTHALMIC at 22:00

## 2018-01-01 RX ADMIN — ENOXAPARIN SODIUM 30 MG: 100 INJECTION SUBCUTANEOUS at 21:42

## 2018-01-01 RX ADMIN — CEFTRIAXONE SODIUM 1 G: 1 INJECTION, POWDER, FOR SOLUTION INTRAMUSCULAR; INTRAVENOUS at 10:01

## 2018-01-01 RX ADMIN — ATORVASTATIN CALCIUM 40 MG: 40 TABLET, FILM COATED ORAL at 20:59

## 2018-01-01 RX ADMIN — ATORVASTATIN CALCIUM 40 MG: 40 TABLET, FILM COATED ORAL at 20:39

## 2018-01-01 RX ADMIN — FAMOTIDINE 40 MG: 40 TABLET ORAL at 08:09

## 2018-01-01 RX ADMIN — ENOXAPARIN SODIUM 30 MG: 100 INJECTION SUBCUTANEOUS at 19:56

## 2018-01-01 RX ADMIN — FAMOTIDINE 40 MG: 40 TABLET ORAL at 09:25

## 2018-01-01 RX ADMIN — SODIUM CHLORIDE 30 ML/HR: 900 INJECTION, SOLUTION INTRAVENOUS at 15:48

## 2018-01-01 RX ADMIN — LINEZOLID 600 MG: 600 TABLET, FILM COATED ORAL at 21:42

## 2018-01-01 RX ADMIN — DORZOLAMIDE HYDROCHLORIDE AND TIMOLOL MALEATE 1 DROP: 20; 5 SOLUTION/ DROPS OPHTHALMIC at 21:14

## 2018-01-01 RX ADMIN — DORZOLAMIDE HYDROCHLORIDE AND TIMOLOL MALEATE 1 DROP: 20; 5 SOLUTION/ DROPS OPHTHALMIC at 21:41

## 2018-01-01 RX ADMIN — Medication 12.5 MG: at 10:15

## 2018-01-01 RX ADMIN — FERROUS SULFATE TAB EC 324 MG (65 MG FE EQUIVALENT) 324 MG: 324 (65 FE) TABLET DELAYED RESPONSE at 09:30

## 2018-01-01 RX ADMIN — FERROUS SULFATE TAB EC 324 MG (65 MG FE EQUIVALENT) 324 MG: 324 (65 FE) TABLET DELAYED RESPONSE at 07:37

## 2018-01-01 RX ADMIN — SODIUM CHLORIDE 100 ML/HR: 900 INJECTION, SOLUTION INTRAVENOUS at 16:23

## 2018-01-01 RX ADMIN — PREDNISONE 40 MG: 20 TABLET ORAL at 08:47

## 2018-01-01 RX ADMIN — FAMOTIDINE 40 MG: 40 TABLET ORAL at 10:03

## 2018-01-01 RX ADMIN — DORZOLAMIDE HYDROCHLORIDE AND TIMOLOL MALEATE 1 DROP: 20; 5 SOLUTION/ DROPS OPHTHALMIC at 10:17

## 2018-01-01 RX ADMIN — FUROSEMIDE 40 MG: 40 TABLET ORAL at 09:25

## 2018-01-01 RX ADMIN — Medication 12.5 MG: at 08:47

## 2018-01-01 RX ADMIN — FERROUS SULFATE TAB EC 324 MG (65 MG FE EQUIVALENT) 324 MG: 324 (65 FE) TABLET DELAYED RESPONSE at 21:42

## 2018-01-01 RX ADMIN — FAMOTIDINE 40 MG: 40 TABLET ORAL at 10:15

## 2018-01-01 RX ADMIN — PANTOPRAZOLE SODIUM 40 MG: 40 TABLET, DELAYED RELEASE ORAL at 05:32

## 2018-01-01 RX ADMIN — ATORVASTATIN CALCIUM 40 MG: 40 TABLET, FILM COATED ORAL at 20:09

## 2018-01-01 RX ADMIN — FUROSEMIDE 40 MG: 40 TABLET ORAL at 10:15

## 2018-01-01 RX ADMIN — LIDOCAINE HYDROCHLORIDE 50 MG: 10 INJECTION, SOLUTION INFILTRATION; PERINEURAL at 17:21

## 2018-01-01 RX ADMIN — ATORVASTATIN CALCIUM 40 MG: 40 TABLET, FILM COATED ORAL at 20:25

## 2018-01-01 RX ADMIN — FERROUS SULFATE TAB EC 324 MG (65 MG FE EQUIVALENT) 324 MG: 324 (65 FE) TABLET DELAYED RESPONSE at 08:04

## 2018-01-01 RX ADMIN — ENOXAPARIN SODIUM 30 MG: 100 INJECTION SUBCUTANEOUS at 20:42

## 2018-01-01 RX ADMIN — FERROUS SULFATE TAB EC 324 MG (65 MG FE EQUIVALENT) 324 MG: 324 (65 FE) TABLET DELAYED RESPONSE at 19:56

## 2018-01-01 RX ADMIN — FERROUS SULFATE TAB EC 324 MG (65 MG FE EQUIVALENT) 324 MG: 324 (65 FE) TABLET DELAYED RESPONSE at 09:25

## 2018-01-01 RX ADMIN — VANCOMYCIN HYDROCHLORIDE 1750 MG: 1 INJECTION, POWDER, LYOPHILIZED, FOR SOLUTION INTRAVENOUS at 13:02

## 2018-01-01 RX ADMIN — DEXAMETHASONE SODIUM PHOSPHATE 8 MG: 10 INJECTION INTRAMUSCULAR; INTRAVENOUS at 15:24

## 2018-01-01 RX ADMIN — PANTOPRAZOLE SODIUM 40 MG: 40 TABLET, DELAYED RELEASE ORAL at 12:50

## 2018-01-01 RX ADMIN — SODIUM BICARBONATE 50 MEQ: 84 INJECTION INTRAVENOUS at 16:59

## 2018-01-01 RX ADMIN — Medication: at 16:11

## 2018-01-01 RX ADMIN — DOXYCYCLINE 100 MG: 100 INJECTION, POWDER, LYOPHILIZED, FOR SOLUTION INTRAVENOUS at 08:24

## 2018-01-01 RX ADMIN — DORZOLAMIDE HYDROCHLORIDE AND TIMOLOL MALEATE 1 DROP: 20; 5 SOLUTION/ DROPS OPHTHALMIC at 08:10

## 2018-01-01 RX ADMIN — LATANOPROST 1 DROP: 50 SOLUTION OPHTHALMIC at 21:00

## 2018-01-01 RX ADMIN — LABETALOL HYDROCHLORIDE 10 MG: 5 INJECTION, SOLUTION INTRAVENOUS at 04:58

## 2018-01-01 RX ADMIN — ATORVASTATIN CALCIUM 40 MG: 40 TABLET, FILM COATED ORAL at 21:42

## 2018-01-01 RX ADMIN — DORZOLAMIDE HYDROCHLORIDE AND TIMOLOL MALEATE 1 DROP: 20; 5 SOLUTION/ DROPS OPHTHALMIC at 08:47

## 2018-01-01 RX ADMIN — ENOXAPARIN SODIUM 30 MG: 100 INJECTION SUBCUTANEOUS at 20:09

## 2018-01-01 RX ADMIN — FUROSEMIDE 40 MG: 40 TABLET ORAL at 08:09

## 2018-01-01 RX ADMIN — EPINEPHRINE 1 MG: 0.1 INJECTION INTRACARDIAC; INTRAVENOUS at 16:56

## 2018-01-01 RX ADMIN — FERROUS SULFATE TAB EC 324 MG (65 MG FE EQUIVALENT) 324 MG: 324 (65 FE) TABLET DELAYED RESPONSE at 20:41

## 2018-01-01 RX ADMIN — PREDNISONE 40 MG: 20 TABLET ORAL at 10:40

## 2018-01-01 RX ADMIN — Medication 12.5 MG: at 08:19

## 2018-01-01 RX ADMIN — EPINEPHRINE 1 MG: 0.1 INJECTION INTRACARDIAC; INTRAVENOUS at 16:52

## 2018-01-01 RX ADMIN — FERROUS SULFATE TAB EC 324 MG (65 MG FE EQUIVALENT) 324 MG: 324 (65 FE) TABLET DELAYED RESPONSE at 20:09

## 2018-01-01 RX ADMIN — EPINEPHRINE 1 MG: 0.1 INJECTION INTRACARDIAC; INTRAVENOUS at 16:54

## 2018-01-01 RX ADMIN — LINEZOLID 600 MG: 100 SUSPENSION ORAL at 23:10

## 2018-01-01 RX ADMIN — LATANOPROST 1 DROP: 50 SOLUTION OPHTHALMIC at 21:41

## 2018-01-01 RX ADMIN — LINEZOLID 600 MG: 600 TABLET, FILM COATED ORAL at 20:09

## 2018-01-01 RX ADMIN — LATANOPROST 1 DROP: 50 SOLUTION OPHTHALMIC at 19:57

## 2018-01-01 RX ADMIN — LATANOPROST 1 DROP: 50 SOLUTION OPHTHALMIC at 20:09

## 2018-01-01 RX ADMIN — LINEZOLID 600 MG: 100 SUSPENSION ORAL at 09:26

## 2018-01-01 RX ADMIN — LINEZOLID 600 MG: 100 SUSPENSION ORAL at 07:37

## 2018-01-01 RX ADMIN — Medication 12.5 MG: at 09:26

## 2018-01-01 RX ADMIN — EPINEPHRINE 1 MG: 0.1 INJECTION INTRACARDIAC; INTRAVENOUS at 16:59

## 2018-01-01 RX ADMIN — LINEZOLID 600 MG: 100 SUSPENSION ORAL at 15:25

## 2018-01-01 RX ADMIN — DORZOLAMIDE HYDROCHLORIDE AND TIMOLOL MALEATE 1 DROP: 20; 5 SOLUTION/ DROPS OPHTHALMIC at 07:37

## 2018-01-01 RX ADMIN — DORZOLAMIDE HYDROCHLORIDE AND TIMOLOL MALEATE 1 DROP: 20; 5 SOLUTION/ DROPS OPHTHALMIC at 23:05

## 2018-01-01 RX ADMIN — LINEZOLID 600 MG: 100 SUSPENSION ORAL at 20:29

## 2018-01-01 RX ADMIN — ASPIRIN 81 MG CHEWABLE TABLET 81 MG: 81 TABLET CHEWABLE at 07:37

## 2018-01-01 RX ADMIN — COLCHICINE 1.2 MG: 0.6 TABLET, FILM COATED ORAL at 11:19

## 2018-01-01 RX ADMIN — DORZOLAMIDE HYDROCHLORIDE AND TIMOLOL MALEATE 1 DROP: 20; 5 SOLUTION/ DROPS OPHTHALMIC at 21:00

## 2018-01-01 RX ADMIN — Medication 12.5 MG: at 09:30

## 2018-01-01 RX ADMIN — LINEZOLID 600 MG: 600 TABLET, FILM COATED ORAL at 10:15

## 2018-01-01 RX ADMIN — LATANOPROST 1 DROP: 50 SOLUTION OPHTHALMIC at 21:14

## 2018-01-01 RX ADMIN — PROPOFOL 60 MG: 10 INJECTION, EMULSION INTRAVENOUS at 17:21

## 2018-01-01 RX ADMIN — FUROSEMIDE 40 MG: 40 TABLET ORAL at 08:47

## 2018-01-01 RX ADMIN — ACETAMINOPHEN 650 MG: 325 TABLET ORAL at 00:22

## 2018-01-01 RX ADMIN — ASPIRIN 81 MG CHEWABLE TABLET 81 MG: 81 TABLET CHEWABLE at 08:19

## 2018-01-01 RX ADMIN — FAMOTIDINE 40 MG: 40 TABLET ORAL at 07:37

## 2018-01-01 RX ADMIN — SODIUM CHLORIDE: 9 INJECTION, SOLUTION INTRAVENOUS at 15:48

## 2018-01-01 RX ADMIN — COLCHICINE 0.6 MG: 0.6 TABLET, FILM COATED ORAL at 12:30

## 2018-01-01 RX ADMIN — Medication 10 ML: at 21:14

## 2018-01-01 RX ADMIN — POTASSIUM CHLORIDE 20 MEQ: 750 CAPSULE, EXTENDED RELEASE ORAL at 10:01

## 2018-01-01 RX ADMIN — Medication 12.5 MG: at 10:03

## 2018-01-01 RX ADMIN — FERROUS SULFATE TAB EC 324 MG (65 MG FE EQUIVALENT) 324 MG: 324 (65 FE) TABLET DELAYED RESPONSE at 10:03

## 2018-01-01 RX ADMIN — DORZOLAMIDE HYDROCHLORIDE AND TIMOLOL MALEATE 1 DROP: 20; 5 SOLUTION/ DROPS OPHTHALMIC at 08:20

## 2018-01-01 RX ADMIN — POTASSIUM CHLORIDE 40 MEQ: 750 CAPSULE, EXTENDED RELEASE ORAL at 08:52

## 2018-01-01 RX ADMIN — ATORVASTATIN CALCIUM 40 MG: 40 TABLET, FILM COATED ORAL at 20:28

## 2018-01-01 RX ADMIN — ATORVASTATIN CALCIUM 40 MG: 40 TABLET, FILM COATED ORAL at 23:03

## 2018-01-01 RX ADMIN — ACETAMINOPHEN 650 MG: 325 TABLET ORAL at 16:32

## 2018-01-01 RX ADMIN — Medication 12.5 MG: at 08:09

## 2018-01-01 RX ADMIN — FERROUS SULFATE TAB EC 324 MG (65 MG FE EQUIVALENT) 324 MG: 324 (65 FE) TABLET DELAYED RESPONSE at 20:25

## 2018-01-01 RX ADMIN — FERROUS SULFATE TAB EC 324 MG (65 MG FE EQUIVALENT) 324 MG: 324 (65 FE) TABLET DELAYED RESPONSE at 10:15

## 2018-01-01 RX ADMIN — LEVOFLOXACIN 750 MG: 5 INJECTION, SOLUTION INTRAVENOUS at 15:26

## 2018-01-01 RX ADMIN — SODIUM CHLORIDE 100 ML/HR: 900 INJECTION, SOLUTION INTRAVENOUS at 18:21

## 2018-01-01 RX ADMIN — FERROUS SULFATE TAB EC 324 MG (65 MG FE EQUIVALENT) 324 MG: 324 (65 FE) TABLET DELAYED RESPONSE at 10:41

## 2018-01-01 RX ADMIN — LATANOPROST 1 DROP: 50 SOLUTION OPHTHALMIC at 20:40

## 2018-01-01 RX ADMIN — ATORVASTATIN CALCIUM 40 MG: 40 TABLET, FILM COATED ORAL at 21:14

## 2018-01-01 RX ADMIN — ASPIRIN 81 MG CHEWABLE TABLET 81 MG: 81 TABLET CHEWABLE at 10:15

## 2018-01-01 RX ADMIN — CEFTRIAXONE SODIUM 1 G: 1 INJECTION, POWDER, FOR SOLUTION INTRAMUSCULAR; INTRAVENOUS at 10:49

## 2018-01-01 RX ADMIN — FAMOTIDINE 40 MG: 40 TABLET ORAL at 10:40

## 2018-01-01 RX ADMIN — FERROUS SULFATE TAB EC 324 MG (65 MG FE EQUIVALENT) 324 MG: 324 (65 FE) TABLET DELAYED RESPONSE at 20:59

## 2018-01-01 RX ADMIN — Medication 12.5 MG: at 10:41

## 2018-01-01 RX ADMIN — FUROSEMIDE 40 MG: 40 TABLET ORAL at 10:03

## 2018-01-01 RX ADMIN — LATANOPROST 1 DROP: 50 SOLUTION OPHTHALMIC at 20:42

## 2018-01-01 RX ADMIN — FAMOTIDINE 40 MG: 40 TABLET ORAL at 08:19

## 2018-01-01 RX ADMIN — DORZOLAMIDE HYDROCHLORIDE AND TIMOLOL MALEATE 1 DROP: 20; 5 SOLUTION/ DROPS OPHTHALMIC at 20:40

## 2018-01-01 RX ADMIN — EPINEPHRINE 1 MG: 0.1 INJECTION INTRACARDIAC; INTRAVENOUS at 16:38

## 2018-01-01 RX ADMIN — LABETALOL HYDROCHLORIDE 10 MG: 5 INJECTION, SOLUTION INTRAVENOUS at 22:00

## 2018-01-01 RX ADMIN — LINEZOLID 600 MG: 600 TABLET, FILM COATED ORAL at 08:09

## 2018-01-01 RX ADMIN — Medication: at 21:42

## 2018-01-01 RX ADMIN — Medication 12.5 MG: at 08:04

## 2018-01-01 RX ADMIN — FAMOTIDINE 40 MG: 40 TABLET ORAL at 08:04

## 2018-01-01 RX ADMIN — ASPIRIN 81 MG CHEWABLE TABLET 81 MG: 81 TABLET CHEWABLE at 08:04

## 2018-01-01 RX ADMIN — LINEZOLID 600 MG: 100 SUSPENSION ORAL at 20:59

## 2018-01-01 RX ADMIN — FUROSEMIDE 40 MG: 40 TABLET ORAL at 07:37

## 2018-01-01 RX ADMIN — LINEZOLID 600 MG: 100 SUSPENSION ORAL at 10:02

## 2018-01-01 RX ADMIN — LATANOPROST 1 DROP: 50 SOLUTION OPHTHALMIC at 20:29

## 2018-01-01 RX ADMIN — Medication: at 08:09

## 2018-01-01 RX ADMIN — LINEZOLID 600 MG: 600 TABLET, FILM COATED ORAL at 08:04

## 2018-01-01 RX ADMIN — DORZOLAMIDE HYDROCHLORIDE AND TIMOLOL MALEATE 1 DROP: 20; 5 SOLUTION/ DROPS OPHTHALMIC at 09:30

## 2018-01-01 RX ADMIN — Medication: at 08:04

## 2018-01-01 RX ADMIN — FUROSEMIDE 40 MG: 40 TABLET ORAL at 10:41

## 2018-01-01 RX ADMIN — SODIUM CHLORIDE 100 ML/HR: 900 INJECTION, SOLUTION INTRAVENOUS at 07:44

## 2018-01-01 RX ADMIN — DORZOLAMIDE HYDROCHLORIDE AND TIMOLOL MALEATE 1 DROP: 20; 5 SOLUTION/ DROPS OPHTHALMIC at 10:42

## 2018-01-01 RX ADMIN — ASPIRIN 81 MG CHEWABLE TABLET 81 MG: 81 TABLET CHEWABLE at 08:09

## 2018-01-01 RX ADMIN — ASPIRIN 81 MG CHEWABLE TABLET 81 MG: 81 TABLET CHEWABLE at 10:03

## 2018-01-01 RX ADMIN — FAMOTIDINE 40 MG: 40 TABLET ORAL at 20:24

## 2018-01-01 RX ADMIN — DORZOLAMIDE HYDROCHLORIDE AND TIMOLOL MALEATE 1 DROP: 20; 5 SOLUTION/ DROPS OPHTHALMIC at 19:56

## 2018-01-01 RX ADMIN — DORZOLAMIDE HYDROCHLORIDE AND TIMOLOL MALEATE 1 DROP: 20; 5 SOLUTION/ DROPS OPHTHALMIC at 20:26

## 2018-01-01 RX ADMIN — LATANOPROST 1 DROP: 50 SOLUTION OPHTHALMIC at 23:05

## 2018-01-01 RX ADMIN — DORZOLAMIDE HYDROCHLORIDE AND TIMOLOL MALEATE 1 DROP: 20; 5 SOLUTION/ DROPS OPHTHALMIC at 20:29

## 2018-01-01 RX ADMIN — ATORVASTATIN CALCIUM 40 MG: 40 TABLET, FILM COATED ORAL at 19:56

## 2018-01-01 RX ADMIN — SODIUM CHLORIDE 100 ML/HR: 900 INJECTION, SOLUTION INTRAVENOUS at 14:05

## 2018-01-01 RX ADMIN — Medication 12.5 MG: at 07:37

## 2018-01-01 RX ADMIN — DORZOLAMIDE HYDROCHLORIDE AND TIMOLOL MALEATE 1 DROP: 20; 5 SOLUTION/ DROPS OPHTHALMIC at 08:05

## 2018-01-01 RX ADMIN — FERROUS SULFATE TAB EC 324 MG (65 MG FE EQUIVALENT) 324 MG: 324 (65 FE) TABLET DELAYED RESPONSE at 08:10

## 2018-01-01 RX ADMIN — CEFTRIAXONE SODIUM 1 G: 1 INJECTION, POWDER, FOR SOLUTION INTRAMUSCULAR; INTRAVENOUS at 09:52

## 2018-01-01 RX ADMIN — LINEZOLID 600 MG: 100 SUSPENSION ORAL at 20:43

## 2018-01-01 RX ADMIN — LABETALOL HYDROCHLORIDE 10 MG: 5 INJECTION, SOLUTION INTRAVENOUS at 21:09

## 2018-01-01 RX ADMIN — FERROUS SULFATE TAB EC 324 MG (65 MG FE EQUIVALENT) 324 MG: 324 (65 FE) TABLET DELAYED RESPONSE at 08:20

## 2018-01-01 RX ADMIN — Medication 10 ML: at 10:41

## 2018-01-01 RX ADMIN — FERROUS SULFATE TAB EC 324 MG (65 MG FE EQUIVALENT) 324 MG: 324 (65 FE) TABLET DELAYED RESPONSE at 20:39

## 2018-01-01 RX ADMIN — DORZOLAMIDE HYDROCHLORIDE AND TIMOLOL MALEATE 1 DROP: 20; 5 SOLUTION/ DROPS OPHTHALMIC at 14:06

## 2018-01-01 RX ADMIN — ATORVASTATIN CALCIUM 40 MG: 40 TABLET, FILM COATED ORAL at 20:41

## 2018-01-01 RX ADMIN — ENOXAPARIN SODIUM 30 MG: 100 INJECTION SUBCUTANEOUS at 20:59

## 2018-01-01 RX ADMIN — DORZOLAMIDE HYDROCHLORIDE AND TIMOLOL MALEATE 1 DROP: 20; 5 SOLUTION/ DROPS OPHTHALMIC at 20:42

## 2018-01-03 NOTE — TELEPHONE ENCOUNTER
NADEGE ABARCA IS NEEDING A 90 DAY REFILL ON THE FERROUS SULFATE TO BE SENT TO [facility]..PLEASE ADD THE 3 REFILLS

## 2018-03-14 NOTE — PROGRESS NOTES
Pacemaker Evaluation Report    March 14, 2018    Primary Cardiologist: Mario  Implanting MD: Mario  :[facility]    Model: [employer]ent SR RF 1210  Serial Number: [id number]  Implant date: 3/28/2011    Reason for evaluation: Routine  Cardiac device indication(s):  Chronic Afib    Battery  IGOR: 9.5-10.3 years 2.95 V  Last charge: N/A    Interrogation Results  Ventricular sensing: R wave: 7.6 mV  Ventricular capture: 0.87 V @ 0.5 ms  Ventricular lead impedance: right  280 ohms;     Parameters  Mode: VVIR  Base Rate: 60    Diagnostic Data   Ventricular paced: 88%    Changes made: Cybersecurity upgrade completed    Conclusions: No Episodes.                          Normal device function.    Assessment:   Diagnosis Plan   1. SSS (sick sinus syndrome)     2. Chronic atrial fibrillation     3. Presence of cardiac pacemaker             This document has been electronically signed by FREDO Vasquez on March 15, 2018 4:40 PM

## 2018-03-15 PROBLEM — I49.5 SSS (SICK SINUS SYNDROME) (HCC): Status: ACTIVE | Noted: 2018-01-01

## 2018-03-15 PROBLEM — Z95.0 PRESENCE OF CARDIAC PACEMAKER: Status: ACTIVE | Noted: 2018-01-01

## 2018-04-19 NOTE — PROGRESS NOTES
Subjective   Darian Pedroza is a 89 y.o. male.     Foot Pain   This is a new problem. The current episode started 1 to 4 weeks ago. The problem occurs constantly. The problem has been rapidly worsening. The symptoms are aggravated by standing and walking. He has tried ice (steroids injection at ) for the symptoms. The treatment provided no relief.        The following portions of the patient's history were reviewed and updated as appropriate: allergies, current medications, past family history, past medical history, past social history, past surgical history and problem list.    Review of Systems    Objective   Physical Exam   Constitutional: He is oriented to person, place, and time. He appears well-developed and well-nourished. No distress.   HENT:   Head: Normocephalic and atraumatic.     Vascular Status -  His right foot exhibits abnormal foot edema. His right foot exhibits normal foot vasculature .     Neurological: He is alert and oriented to person, place, and time.   Skin: Skin is warm and dry. He is not diaphoretic.   Psychiatric: He has a normal mood and affect. His behavior is normal. Judgment and thought content normal.   Nursing note and vitals reviewed.      Assessment/Plan   Problems Addressed this Visit     None      Visit Diagnoses     Stage 3 chronic kidney disease    -  Primary    Right foot pain                Lisfranc type abnormality on x-ray, had apt with podiatry. Pian not controlled. Avoid NSAIDS.

## 2018-04-25 NOTE — PROGRESS NOTES
Darian Pedroza  5/27/1928  89 y.o. male   PCP: Dr. Peña last seen 11/7/2017.  Patient presents today for right foot pain.      4/25/2018  Chief Complaint   Patient presents with   • Right Foot - Pain, Plantar Fasciitis           History of Present Illness    Patient presents to clinic with chief complaint of right foot pain.  Pain started several weeks ago.  It was located to the bottom of his heel at that time.  He received steroid injection and most recently tramadol. He has had x-rays of the foot. His pain is much better today.  He rates his pain as a 4 out of 10.   He is ambulating in house shoes.  He has no other pedal complaints.      Past Medical History:   Diagnosis Date   • Abrasion of lower limb    • Acute gastritis    • Allergic rhinitis    • Anal pain    • Anemia      chronic GI loss and renal failure      • Anemia     due to blood loss   • Atrophic gastritis    • Benign prostatic hyperplasia    • Bronchopneumonia    • CAD (coronary artery disease)    • Cellulitis and abscess of leg    • CHF (congestive heart failure)    • Chronic allergic conjunctivitis    • Chronic atrial fibrillation    • COPD (chronic obstructive pulmonary disease)    • Coronary arteriosclerosis    • Coronary atherosclerosis of native coronary artery    • Cortical senile cataract    • Cough    • Diverticulitis of colon    • Dyspnea    • Edema     multifactorial   • Edema of leg    • Essential hypertension    • External hemorrhoids without complication    • Fever    • Flank pain    • Glaucoma     open angle   • Heart failure    • Hemorrhoids    • History of colon polyps    • Hypermetropia    • Hypertension    • Idiopathic gout     left ankle and foot   • Inguinal pain      probable injury to scar tissue related to penile implant      • Myocardial infarction    • Nasal congestion    • Nuclear cataract    • Occult blood in stools    • Onychomycosis    • Primary open angle glaucoma     advanced, progression of VF with IOP in mid  teens for past 2+ years; on max tolerated meds      • Rectal hemorrhage    • Renal impairment    • Screening for malignant neoplasm of prostate    • URI (upper respiratory infection)    • UTI (urinary tract infection)    • Vitamin D deficiency          Past Surgical History:   Procedure Laterality Date   • ANAL FISSURECTOMY  03/05/1970    Fissurectomy & sphincterectomy. Anal fissure   • CARDIAC CATHETERIZATION  11/05/2002    [facility]. Jasper Carey M.D. LV w/ moderate hypokinesis. EF 50%. CAD w/ total occlusion CX & RCA high grade stenosis 1st OM. LIMA to LAD is patent, Vein graft to PDA, graft to diagonal branch, distal 2 branches CX.   • CORONARY ARTERY BYPASS GRAFT  1997    x 5, Dr JOSE Centeno   • ENDOSCOPY  05/15/2013    with tube-Normal esophagus. Gastritis in stomach. Biospy taken. Normal duodenum   • ENDOSCOPY AND COLONOSCOPY  06/03/2013    Internal & external hemorrhoids found   • EYE SURGERY  03/18/2013   • INJECTION OF MEDICATION  01/29/2016    Kenalog   • KIDNEY STONE SURGERY  09/10/1998    Dr Johnston   • MOUTH SURGERY  1987    Lower gum build up Dr Juanito Prasad, D.D.S.   • OTHER SURGICAL HISTORY  1988    Anesth, insert penis device    • PACEMAKER IMPLANTATION  03/2011   • REPLACEMENT TOTAL KNEE     • REPLACEMENT TOTAL KNEE  12/28/2005    Right, done by Dr Nails         Family History   Problem Relation Age of Onset   • Hypertension Other    • Heart disease Other    • Arthritis Mother    • Stroke Mother    • Cancer Brother          Social History     Social History   • Marital status:      Spouse name: N/A   • Number of children: N/A   • Years of education: N/A     Occupational History   • Not on file.     Social History Main Topics   • Smoking status: Former Smoker   • Smokeless tobacco: Never Used   • Alcohol use No   • Drug use: No   • Sexual activity: Defer     Other Topics Concern   • Not on file     Social History Narrative   • No narrative on file  "        Current Outpatient Prescriptions   Medication Sig Dispense Refill   • aspirin 81 MG chewable tablet Chew 81 mg Daily.     • atorvastatin (LIPITOR) 40 MG tablet TAKE 1 TABLET AT BEDTIME   FOR CHOLESTEROL 90 tablet 3   • bimatoprost (LUMIGAN) 0.01 % ophthalmic drops 1 drop Every Night.     • ferrous sulfate 325 (65 FE) MG tablet Take 1 tablet by mouth 2 (Two) Times a Day. 180 tablet 2   • furosemide (LASIX) 40 MG tablet TAKE 1 TABLET DAILY 90 tablet 3   • losartan (COZAAR) 100 MG tablet TAKE 1 TABLET DAILY FOR    BLOOD PRESSURE/HEART 90 tablet 3   • spironolactone (ALDACTONE) 25 MG tablet TAKE 1/2 TABLET DAILY 45 tablet 3   • traMADol (ULTRAM) 50 MG tablet Take 1 tablet by mouth Every 6 (Six) Hours As Needed for Moderate Pain . 40 tablet 0   • vitamin D (ERGOCALCIFEROL) 99729 units capsule capsule TAKE 1 CAPSULE ONCE WEEKLY 12 capsule 3     No current facility-administered medications for this visit.          OBJECTIVE    Pulse 64   Ht 170.2 cm (67\")   Wt 77.6 kg (171 lb)   SpO2 95%   BMI 26.78 kg/m²       Review of Systems   Constitutional: Negative for chills and fever.   Cardiovascular: Negative for chest pain.   Gastrointestinal: Negative for constipation, diarrhea, nausea and vomiting.   Skin: Negative for wound.     Musculoskeletal: foot pain      Constitutional: well developed, well nourished    HEENT: Normocephalic and atraumatic, normal hearing    Respiratory: Non labored respirations noted    RLE Exam    Cardiovascular:    DP/PT pulses palpable   Pedal hair growth absent.   No erythema noted  Edema noted diffusely     Musculoskeletal:  Muscle strength is 5/5 for all muscle groups tested   ROM of the 1st MTP is decreased without pain or crepitus  ROM of the MTJ is full without pain or crepitus    ROM of the STJ is full without pain or crepitus    ROM of the ankle joint is decreased without pain or crepitus    No pain on palpation noted    Dermatological:   Webspaces 1-4 bilateral are clean, dry " and intact.   No subcutaneous nodules or masses noted    No open wounds noted     Neurological:   Protective sensation intact    Sensation intact to light touch    DTR intact    Psychiatric: A&O x 3 with normal mood and affect. NAD.           Procedures        ASSESSMENT AND PLAN    Darian was seen today for pain and plantar fasciitis.    Diagnoses and all orders for this visit:    Plantar fasciitis      - Comprehensive foot and ankle exam performed.   - X-rays reviewed.  No acute osseous abnormalities noted.  Age-related degenerative changes noted.  - Patient is currently doing very well.  His pain is significantly improved.  - Recommend patient to stretch and wear more supportive shoe gear.  - All questions were answered to the patients satisfaction.  - RTC if symptoms worsen or fail to resolve            This document has been electronically signed by Jamar Garces DPM on April 25, 2018 10:10 AM     4/25/2018  10:10 AM

## 2018-06-06 NOTE — PROGRESS NOTES
Darian Pedroza  89 y.o. male    06/06/2018  1. Essential hypertension    2. Atherosclerosis of native coronary artery of native heart without angina pectoris    3. Chronic atrial fibrillation    4. Non-rheumatic tricuspid valve insufficiency    5. Heart failure with preserved ejection fraction, borderline, class II    6. 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  Had a strong history of GI bleed not a good candidate for long-term oral anti-coagulation. .  He had mild shortness of breath on the basis of diastolic dysfunction.  Patient denies orthopnea PND, nauseous, vomiting, diarrhea.  Denies any polydipsia polyuria.  Denies any intermittent claudication      ECHO: 4/21/2017    · Left ventricular function is normal. Estimated EF = 55%.  · Left ventricular wall thickness is consistent with mild concentric hypertrophy.  · Left atrial cavity size is severely dilated.  · Moderate tricuspid valve regurgitation is present.  · calcification of the aortic valve  · Mild mitral valve regurgitation is present  · Mild aortic valve regurgitation is present.  · Mild aortic valve stenosis is present.    6/9/2015   Ref Range & Units 2yr ago   LDL Cholesterol  0 - 129 mg/dl 42    Comment: LDL DESIRED: < 130 MG/DL       SUBJECTIVE    No Known Allergies      Past Medical History:   Diagnosis Date   • Abrasion of lower limb    • Acute gastritis    • Allergic rhinitis    • Anal pain    • Anemia      chronic GI loss and renal failure      • Anemia     due to blood loss   • Atrophic gastritis    • Benign prostatic hyperplasia    • Bronchopneumonia    • CAD  (coronary artery disease)    • Cellulitis and abscess of leg    • CHF (congestive heart failure)    • Chronic allergic conjunctivitis    • Chronic atrial fibrillation    • COPD (chronic obstructive pulmonary disease)    • Coronary arteriosclerosis    • Coronary atherosclerosis of native coronary artery    • Cortical senile cataract    • Cough    • Diverticulitis of colon    • Dyspnea    • Edema     multifactorial   • Edema of leg    • Essential hypertension    • External hemorrhoids without complication    • Fever    • Flank pain    • Glaucoma     open angle   • Heart failure    • Hemorrhoids    • History of colon polyps    • Hypermetropia    • Hypertension    • Idiopathic gout     left ankle and foot   • Inguinal pain      probable injury to scar tissue related to penile implant      • Myocardial infarction    • Nasal congestion    • Nuclear cataract    • Occult blood in stools    • Onychomycosis    • Primary open angle glaucoma     advanced, progression of VF with IOP in mid teens for past 2+ years; on max tolerated meds      • Rectal hemorrhage    • Renal impairment    • Screening for malignant neoplasm of prostate    • URI (upper respiratory infection)    • UTI (urinary tract infection)    • Vitamin D deficiency          Past Surgical History:   Procedure Laterality Date   • ANAL FISSURECTOMY  03/05/1970    Fissurectomy & sphincterectomy. Anal fissure   • CARDIAC CATHETERIZATION  11/05/2002    [facility]. Jasper Carey M.D. LV w/ moderate hypokinesis. EF 50%. CAD w/ total occlusion CX & RCA high grade stenosis 1st OM. LIMA to LAD is patent, Vein graft to PDA, graft to diagonal branch, distal 2 branches CX.   • CORONARY ARTERY BYPASS GRAFT  1997    x 5, Dr JOSE Centeno   • ENDOSCOPY  05/15/2013    with tube-Normal esophagus. Gastritis in stomach. Biospy taken. Normal duodenum   • ENDOSCOPY AND COLONOSCOPY  06/03/2013    Internal & external hemorrhoids found   • EYE SURGERY  03/18/2013    • INJECTION OF MEDICATION  01/29/2016    Kenalog   • KIDNEY STONE SURGERY  09/10/1998    Dr Johnston   • MOUTH SURGERY  1987    Lower gum build up Dr Juanito Prasad, D.D.S.   • OTHER SURGICAL HISTORY  1988    Anesth, insert penis device    • PACEMAKER IMPLANTATION  03/2011   • REPLACEMENT TOTAL KNEE     • REPLACEMENT TOTAL KNEE  12/28/2005    Right, done by Dr Nails         Family History   Problem Relation Age of Onset   • Hypertension Other    • Heart disease Other    • Arthritis Mother    • Stroke Mother    • Cancer Brother          Social History     Social History   • Marital status:      Spouse name: N/A   • Number of children: N/A   • Years of education: N/A     Occupational History   • Not on file.     Social History Main Topics   • Smoking status: Former Smoker   • Smokeless tobacco: Never Used   • Alcohol use No   • Drug use: No   • Sexual activity: Defer     Other Topics Concern   • Not on file     Social History Narrative   • No narrative on file         Current Outpatient Prescriptions   Medication Sig Dispense Refill   • aspirin 81 MG chewable tablet Chew 81 mg Daily.     • atorvastatin (LIPITOR) 40 MG tablet TAKE 1 TABLET AT BEDTIME   FOR CHOLESTEROL 90 tablet 3   • bimatoprost (LUMIGAN) 0.01 % ophthalmic drops 1 drop Every Night.     • ferrous sulfate 325 (65 FE) MG tablet Take 1 tablet by mouth 2 (Two) Times a Day. 180 tablet 2   • furosemide (LASIX) 40 MG tablet TAKE 1 TABLET DAILY 90 tablet 3   • losartan (COZAAR) 100 MG tablet TAKE 1 TABLET DAILY FOR    BLOOD PRESSURE/HEART 90 tablet 3   • spironolactone (ALDACTONE) 25 MG tablet TAKE 1/2 TABLET DAILY 45 tablet 3   • traMADol (ULTRAM) 50 MG tablet Take 1 tablet by mouth Every 6 (Six) Hours As Needed for Moderate Pain . 40 tablet 0   • vitamin D (ERGOCALCIFEROL) 90600 units capsule capsule TAKE 1 CAPSULE ONCE WEEKLY 12 capsule 3     No current facility-administered medications for this visit.          OBJECTIVE    /60 (BP  "Location: Left arm, Patient Position: Sitting)   Pulse 71   Ht 170 cm (66.93\")   Wt 78.7 kg (173 lb 9.6 oz)   SpO2 98%   BMI 27.25 kg/m²         Review of Systems     Constitutional:  Denies recent weight loss, weight gain, fever or chills, no change in exercise tolerance     HENT:  Denies any hearing loss, epistaxis, hoarseness, or difficulty speaking.     Eyes: Wears eyeglasses or contact lenses     Respiratory:  Denies dyspnea with exertion,no cough, wheezing, or hemoptysis.     Cardiovascular: Negative for palpations, chest pain, orthopnea, PND, peripheral edema, syncope, or claudication.     Gastrointestinal:  Denies change in bowel habits, dyspepsia, ulcer disease, hematochezia, or melena.     Endocrine: Negative for cold intolerance, heat intolerance, polydipsia, polyphagia and polyuria. Denies any history of weight change, heat/cold intolerance, polydipsia, polyuria     Genitourinary: Negative.      Musculoskeletal: Denies any history of arthritic symptoms or back problems     Skin:  Denies any change in hair or nails, rashes, or skin lesions.     Allergic/Immunologic: Negative.  Negative for environmental allergies, food allergies and immunocompromised state.     Neurological:  Denies any history of recurrent headaches, strokes, TIA, or seizure disorder.     Hematological: Denies any food allergies, seasonal allergies, bleeding disorders, or lymphadenopathy.     Psychiatric/Behavioral: Denies any history of depression, substance abuse, or change in cognitive function.         Physical Exam     Constitutional: Cooperative, alert and oriented, well-developed, well-nourished, in no acute distress.     HENT:   Head: Normocephalic, normal hair patterns, no masses or tenderness.  Ears, Nose, and Throat: No gross abnormalities. No pallor or cyanosis. Dentition good.   Eyes: EOMS intact, PERRL, conjunctivae and lids unremarkable. Fundoscopic exam and visual fields not performed.   Neck: No palpable masses or " adenopathy, no thyromegaly, no JVD, carotid pulses are full and equal bilaterally and without  Bruits.     Cardiovascular: Regular rhythm, S1 and S2 normal, no S3 or S4. Apical impulse not displaced. No murmurs, gallops, or rubs detected.     Pulmonary/Chest: Chest: normal symmetry, no tenderness to palpation, normal respiratory excursion, no intercostal retraction, no use of accessory muscles.            Pulmonary: Normal breath sounds. No rales or ronchi.    Abdominal: Abdomen soft, bowel sounds normoactive, no masses, no hepatosplenomegaly, non-tender, no bruits.     Musculoskeletal: No deformities, clubbing, cyanosis, erythema, or edema observed. There are no spinal abnormalities noted. Normal muscle strength and tone. Pulses full and equal in all extremities, no bruits auscultated.     Neurological: No gross motor or sensory deficits noted, affect appropriate, oriented to time, person, place.     Skin: Warm and dry to the touch, no apparent skin lesions or masses noted.     Psychiatric: He has a normal mood and affect. His behavior is normal. Judgment and thought content normal.         Procedures      Lab Results   Component Value Date    WBC 8.51 11/07/2017    HGB 12.6 (L) 11/07/2017    HCT 38.1 (L) 11/07/2017    MCV 96.0 11/07/2017     11/07/2017     Lab Results   Component Value Date    GLUCOSE 102 (H) 11/07/2017    BUN 27 (H) 11/07/2017    CREATININE 1.69 (H) 11/07/2017    EGFRIFAFRI 47 11/07/2017    BCR 16.0 11/07/2017    CO2 29.0 11/07/2017    CALCIUM 9.6 11/07/2017    ALBUMIN 4.50 11/07/2017    LABIL2 1.2 11/07/2017    AST 38 11/07/2017    ALT 24 11/07/2017     No results found for: CHOL  No results found for: TRIG  No results found for: HDL  No components found for: LDLCALC  Lab Results   Component Value Date    LDL 42 06/09/2015     No results found for: HDLLDLRATIO  No components found for: CHOLHDL  No results found for: HGBA1C  Lab Results   Component Value Date    TSH 1.16 05/16/2014            ASSESSMENT AND PLAN    #1 CAD status post CABG #2 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  No evidence evidence of  active ischemia.  The patient's is still physically very active and  with good mental status.  The patient will continue losartan for hypertension with good blood pressure control.  Zaroxolyn   as needed along with continuation of spironolactone.  Diltiazem as a part of AV jacquelyn blocking drug and atorvastatin.   aspirin with history of for CAD  Darian was seen today for follow-up.    Diagnoses and all orders for this visit:    Essential hypertension    Atherosclerosis of native coronary artery of native heart without angina pectoris    Chronic atrial fibrillation    Non-rheumatic tricuspid valve insufficiency    Heart failure with preserved ejection fraction, borderline, class II    SSS (sick sinus syndrome)        Adam Martin MD  6/6/2018  10:58 AM

## 2018-06-11 PROBLEM — N18.30 STAGE 3 CHRONIC KIDNEY DISEASE (HCC): Status: ACTIVE | Noted: 2018-01-01

## 2018-06-11 NOTE — PROGRESS NOTES
Subjective   Darian Pedroza is a 89 y.o. male.   Chief Complaint   Patient presents with   • Hypertension     For review and evaluation of management of chronic medical problems. Having trouble with hemorrhoids, has internal and external.   Hypertension   This is a chronic problem. The current episode started more than 1 year ago. The problem is unchanged. The problem is controlled. Pertinent negatives include no chest pain, headaches, neck pain, palpitations or shortness of breath. There are no associated agents to hypertension. Current antihypertension treatment includes diuretics and angiotensin blockers. The current treatment provides significant improvement. There are no compliance problems.    Atrial Fibrillation   Presents for follow-up visit. Symptoms are negative for bradycardia, chest pain, dizziness, hypertension, hypotension, palpitations, shortness of breath, syncope, tachycardia and weakness. The symptoms have been resolved. Past medical history includes atrial fibrillation. There are no medication compliance problems.   Chronic Kidney Disease   This is a chronic problem. The current episode started more than 1 year ago. The problem occurs constantly. The problem has been unchanged. Pertinent negatives include no abdominal pain, arthralgias, chest pain, chills, congestion, coughing, fatigue, fever, headaches, joint swelling, myalgias, nausea, neck pain, numbness, rash, sore throat, vomiting or weakness. Nothing aggravates the symptoms.      The following portions of the patient's history were reviewed and updated as appropriate: allergies, current medications, past social history and problem list.    Outpatient Medications Prior to Visit   Medication Sig Dispense Refill   • aspirin 81 MG chewable tablet Chew 81 mg Daily.     • atorvastatin (LIPITOR) 40 MG tablet TAKE 1 TABLET AT BEDTIME   FOR CHOLESTEROL 90 tablet 3   • bimatoprost (LUMIGAN) 0.01 % ophthalmic drops 1 drop Every Night.     •  "ferrous sulfate 325 (65 FE) MG tablet Take 1 tablet by mouth 2 (Two) Times a Day. 180 tablet 2   • furosemide (LASIX) 40 MG tablet TAKE 1 TABLET DAILY 90 tablet 3   • losartan (COZAAR) 100 MG tablet TAKE 1 TABLET DAILY FOR    BLOOD PRESSURE/HEART 90 tablet 3   • spironolactone (ALDACTONE) 25 MG tablet TAKE 1/2 TABLET DAILY 45 tablet 3   • vitamin D (ERGOCALCIFEROL) 10434 units capsule capsule TAKE 1 CAPSULE ONCE WEEKLY 12 capsule 3   • traMADol (ULTRAM) 50 MG tablet Take 1 tablet by mouth Every 6 (Six) Hours As Needed for Moderate Pain . 40 tablet 0     No facility-administered medications prior to visit.        Review of Systems   Constitutional: Negative.  Negative for chills, fatigue, fever and unexpected weight change.   HENT: Negative.  Negative for congestion, ear pain, hearing loss, nosebleeds, rhinorrhea, sneezing, sore throat and tinnitus.    Eyes: Negative.  Negative for discharge.   Respiratory: Negative.  Negative for cough, shortness of breath and wheezing.    Cardiovascular: Negative.  Negative for chest pain, palpitations and syncope.   Gastrointestinal: Negative.  Negative for abdominal pain, constipation, diarrhea, nausea and vomiting.   Endocrine: Negative.    Genitourinary: Negative.  Negative for dysuria, frequency and urgency.   Musculoskeletal: Negative.  Negative for arthralgias, back pain, joint swelling, myalgias and neck pain.   Skin: Negative.  Negative for rash.   Allergic/Immunologic: Negative.    Neurological: Negative.  Negative for dizziness, weakness, numbness and headaches.   Hematological: Negative.  Negative for adenopathy.   Psychiatric/Behavioral: Negative.  Negative for dysphoric mood and sleep disturbance. The patient is not nervous/anxious.        Objective   Visit Vitals  /72   Pulse 92   Ht 170.2 cm (67\")   Wt 74.4 kg (164 lb)   SpO2 94%   BMI 25.69 kg/m²     Physical Exam   Constitutional: He is oriented to person, place, and time. He appears well-developed and " well-nourished. No distress.   HENT:   Head: Normocephalic.   Nose: Nose normal.   Mouth/Throat: Oropharynx is clear and moist.   Eyes: Conjunctivae and EOM are normal. Pupils are equal, round, and reactive to light. Right eye exhibits no discharge. Left eye exhibits no discharge.   Neck: No thyromegaly present.   Cardiovascular: Normal rate, regular rhythm, normal heart sounds and intact distal pulses.    No murmur heard.  Pulmonary/Chest: Effort normal and breath sounds normal.   Musculoskeletal: He exhibits no edema.   Lymphadenopathy:     He has no cervical adenopathy.   Neurological: He is alert and oriented to person, place, and time.   Skin: Skin is warm and dry.   Psychiatric: He has a normal mood and affect.   Nursing note and vitals reviewed.      Assessment/Plan   Problem List Items Addressed This Visit        Cardiovascular and Mediastinum    Chronic atrial fibrillation (Chronic)    Relevant Orders    Comprehensive Metabolic Panel    CBC & Differential    Urinalysis With / Culture If Indicated - Urine, Clean Catch    Vitamin D 25 Hydroxy    Essential hypertension - Primary (Chronic)    Relevant Orders    Comprehensive Metabolic Panel    CBC & Differential    Urinalysis With / Culture If Indicated - Urine, Clean Catch    Vitamin D 25 Hydroxy       Genitourinary    Stage 3 chronic kidney disease    Relevant Orders    Basic Metabolic Panel (Completed)    CBC & Differential    Vitamin D 25 Hydroxy    CBC Auto Differential (Completed)    Manual Differential    Comprehensive Metabolic Panel    CBC & Differential    Urinalysis With / Culture If Indicated - Urine, Clean Catch    Vitamin D 25 Hydroxy       Hematopoietic and Hemostatic    Anemia    Relevant Orders    CBC & Differential    CBC Auto Differential (Completed)    Manual Differential      Other Visit Diagnoses     Hemorrhoids, unspecified hemorrhoid type  (Chronic)       Relevant Orders    Ambulatory Referral to General Surgery          Continue current  treatment. Will notify regarding results.     No orders of the defined types were placed in this encounter.    No Follow-up on file.

## 2018-09-17 NOTE — TELEPHONE ENCOUNTER
Patient has called and said that his foot is very swollen and sore. He states he doesn't feel like coming in to see her and wants to know if she can call him in a Rx for antibiotics. Call him at [phone]

## 2018-09-19 PROBLEM — L03.116 CELLULITIS OF LEFT LOWER EXTREMITY: Status: ACTIVE | Noted: 2018-01-01

## 2018-09-19 NOTE — PROGRESS NOTES
Pacemaker Evaluation Report    September 19, 2018    Primary Cardiologist: Dr. Martin  Implanting MD: Dr. Martin  :[employer] Model: Accent DR RF Serial Number: [id number]  Implant date: 3/28/2011    Reason for evaluation:routine  Remote  PPM  Cardiac device indication(s): Chronic AFib    Battery  IGOR: 7.8-8.2 years  2.   Last charge: 0    Interrogation Results  Ventricular sensing: R wave: 7.4 mV  Ventricular capture: 0.875 V @ 0.5 ms  Ventricular lead impedance: right  260 ohms    Parameters  Mode: VVIR  Base Rate: 60    Diagnostic Data  Ventricular paced: 87 %  Mode switch: 0%  AT/AF [place]: 0%  AHR: 0  VHR: 1    Changes made: no changes, remote    Conclusions: normal device function    Assessment:  1. SSS (sick sinus syndrome) (CMS/HCC)    2. Presence of cardiac pacemaker            This document has been electronically signed by FREDO Vasquez on September 19, 2018 12:20 PM

## 2018-09-22 PROBLEM — N30.00 ACUTE CYSTITIS WITHOUT HEMATURIA: Status: ACTIVE | Noted: 2018-01-01

## 2018-09-22 PROBLEM — R41.0 DELIRIUM: Status: ACTIVE | Noted: 2018-01-01

## 2018-09-28 NOTE — ANESTHESIA POSTPROCEDURE EVALUATION
Patient: Darian Pedroza    Procedure Summary     Date:  09/28/18 Room / Location:  [facility] ENDOSCOPY 1 / [facility] ENDOSCOPY    Anesthesia Start:  1634 Anesthesia Stop:  1730    Procedure:  ESOPHAGOGASTRODUODENOSCOPY (N/A ) Diagnosis:       Anemia, unspecified type      (Anemia, unspecified type [D64.9])    Surgeon:  Juanito Melara MD Provider:  Tc Valenzuela CRNA    Anesthesia Type:  MAC ASA Status:  4          Anesthesia Type: MAC  Last vitals  BP   (!) 211/92 (09/28/18 1540)   Temp   98 °F (36.7 °C) (09/28/18 1134)   Pulse   60 (09/28/18 1540)   Resp   20 (09/28/18 1540)     SpO2   96 % (09/28/18 1540)     Post Anesthesia Care and Evaluation    Patient location during evaluation: PACU  Patient participation: complete - patient participated  Level of consciousness: awake  Pain score: 0  Pain management: adequate  Airway patency: patent  Anesthetic complications: No anesthetic complications  PONV Status: none  Cardiovascular status: acceptable  Respiratory status: acceptable  Hydration status: acceptable

## 2018-09-28 NOTE — ANESTHESIA PREPROCEDURE EVALUATION
Anesthesia Evaluation     Patient summary reviewed and Nursing notes reviewed   NPO Solid Status: > 8 hours  NPO Liquid Status: > 8 hours           Airway   No difficulty expected  Dental      Pulmonary     breath sounds clear to auscultation  (+) pneumonia , COPD moderate, shortness of breath, recent URI,   Cardiovascular - normal exam    (+) hypertension, past MI  >12 months, CAD, CABG, dysrhythmias Atrial Fib, CHF,       Neuro/Psych  (+) psychiatric history Depression,     GI/Hepatic/Renal/Endo    (+)  GI bleeding,     Musculoskeletal     Abdominal   (+) obese,    Substance History      OB/GYN          Other   (+) arthritis                     Anesthesia Plan    ASA 4     MAC     intravenous induction   Anesthetic plan, all risks, benefits, and alternatives have been provided, discussed and informed consent has been obtained with: patient.    Plan discussed with CRNA.

## 2018-10-01 NOTE — THERAPY DISCHARGE NOTE
Acute Care - Occupational Therapy Discharge Summary  [facility]     Patient Name: Darian Pedroza  : 1928  MRN: [id number]    Today's Date: 10/1/2018  Onset of Illness/Injury or Date of Surgery: 18    Date of Referral to OT: 18  Referring Physician: FREDO Storm      Admit Date: 2018        OT Recommendation and Plan    Visit Dx:    ICD-10-CM ICD-9-CM   1. Cellulitis of left lower extremity L03.116 682.6   2. Impaired functional mobility, balance, gait, and endurance Z74.09 V49.89   3. Impaired mobility and ADLs Z74.09 799.89   4. Anemia, unspecified type D64.9 285.9                     OT Rehab Goals     Row Name 10/01/18 1621             Transfer Goal 1 (OT)    Activity/Assistive Device (Transfer Goal 1, OT) toilet   BSC  -AS      [place] Level/Cues Needed (Transfer Goal 1, OT) maximum assist (25-49% patient effort)  -AS      Time Frame (Transfer Goal 1, OT) long term goal (LTG);by discharge  -AS      Progress/Outcome (Transfer Goal 1, OT) goal not met  -AS         Bathing Goal 1 (OT)    Activity/Assistive Device (Bathing Goal 1, OT) bathing skills, all  -AS      [place] Level/Cues Needed (Bathing Goal 1, OT) moderate assist (50-74% patient effort)  -AS      Time Frame (Bathing Goal 1, OT) long term goal (LTG);by discharge  -AS      Progress/Outcomes (Bathing Goal 1, OT) goal not met  -AS         Dressing Goal 1 (OT)    Activity/Assistive Device (Dressing Goal 1, OT) dressing skills, all  -AS      [place]/Cues Needed (Dressing Goal 1, OT) moderate assist (50-74% patient effort)  -AS      Time Frame (Dressing Goal 1, OT) long term goal (LTG);by discharge  -AS      Progress/Outcome (Dressing Goal 1, OT) goal not met  -AS         Toileting Goal 1 (OT)    Activity/Device (Toileting Goal 1, OT) toileting skills, all  -AS      [place] Level/Cues Needed (Toileting Goal 1, OT) moderate assist (50-74% patient effort)  -AS      Time Frame (Toileting Goal 1, OT)  long term goal (LTG);by discharge  -AS      Progress/Outcome (Toileting Goal 1, OT) goal not met  -AS        User Key  (r) = Recorded By, (t) = Taken By, (c) = Cosigned By    Initials Name Provider Type Discipline    AS Maritza Harmon, OT Occupational Therapist OT              Therapy Suggested Charges     Code   Minutes Charges    None                 OT Discharge Summary  Anticipated Discharge Disposition (OT): home with assist, home with home health  Reason for Discharge: Per MD order, Discharge from facility  Outcomes Achieved: Refer to plan of care for updates on goals achieved (Pt d/c prior to all goals being met.)  Discharge Destination: SNF      Maritza Harmon OT  10/1/2018

## 2018-10-09 NOTE — PROGRESS NOTES
Subjective   Darian Pedroza is a 89 y.o. male.   Chief Complaint   Patient presents with   • Follow-up     er deejay    • Hypertension     History of Present Illness     Recent hospitalization, labs, xrays reviewed and medications reconciled. Had cellulitis of the lower extremities and was treated with antibiotic. Also found to be anemic and underwent EGD but no acute bleeding was found. Treated medically. Is feeling better, is in the NH for rehab and doing well.     The following portions of the patient's history were reviewed and updated as appropriate: allergies, current medications, past social history and problem list.    Outpatient Medications Prior to Visit   Medication Sig Dispense Refill   • aspirin 81 MG EC tablet Take 1 tablet by mouth Daily. 30 tablet 0   • atorvastatin (LIPITOR) 40 MG tablet Take 1 tablet by mouth Every Night. for cholesterol. 90 tablet 3   • bimatoprost (LUMIGAN) 0.01 % ophthalmic drops Administer 1 drop to both eyes Every Night.     • brimonidine-timolol (COMBIGAN) 0.2-0.5 % ophthalmic solution Administer 1 drop to both eyes Every 12 (Twelve) Hours.     • ferrous sulfate 325 (65 FE) MG tablet Take 1 tablet by mouth 2 (Two) Times a Day. 180 tablet 2   • furosemide (LASIX) 40 MG tablet TAKE 1 TABLET DAILY 90 tablet 3   • pantoprazole (PROTONIX) 40 MG EC tablet Take 1 tablet by mouth Daily. 30 tablet 0   • spironolactone (ALDACTONE) 25 MG tablet TAKE 1/2 TABLET DAILY 45 tablet 3   • sucralfate (CARAFATE) 1 GM/10ML suspension Take 10 mL by mouth 4 (Four) Times a Day With Meals & at Bedtime. 420 mL 0   • traMADol (ULTRAM) 50 MG tablet Take 1 tablet by mouth Every 6 (Six) Hours As Needed for Moderate Pain . 12 tablet 0   • vitamin D (ERGOCALCIFEROL) 64774 units capsule capsule TAKE 1 CAPSULE ONCE WEEKLY 12 capsule 3     No facility-administered medications prior to visit.        Review of Systems   Constitutional: Negative.  Negative for chills, fatigue, fever and unexpected weight  "change.   HENT: Negative.  Negative for congestion, ear pain, hearing loss, nosebleeds, rhinorrhea, sneezing, sore throat and tinnitus.    Eyes: Negative.  Negative for discharge.   Respiratory: Negative.  Negative for cough, shortness of breath and wheezing.    Cardiovascular: Negative.  Negative for chest pain and palpitations.   Gastrointestinal: Negative.  Negative for abdominal pain, constipation, diarrhea, nausea and vomiting.   Endocrine: Negative.    Genitourinary: Negative.  Negative for dysuria, frequency and urgency.   Musculoskeletal: Negative.  Negative for arthralgias, back pain, joint swelling, myalgias and neck pain.   Skin: Negative.  Negative for rash.   Allergic/Immunologic: Negative.    Neurological: Negative.  Negative for dizziness, weakness, numbness and headaches.   Hematological: Negative.  Negative for adenopathy.   Psychiatric/Behavioral: Negative.  Negative for dysphoric mood and sleep disturbance. The patient is not nervous/anxious.        Objective   Visit Vitals  /60   Pulse 80   Ht 170.2 cm (67\")   Wt 83.4 kg (183 lb 14.4 oz)   SpO2 95%   BMI 28.80 kg/m²     Physical Exam   Constitutional: He is oriented to person, place, and time. He appears well-developed and well-nourished. No distress.   HENT:   Head: Normocephalic.   Nose: Nose normal.   Mouth/Throat: Oropharynx is clear and moist.   Eyes: Pupils are equal, round, and reactive to light. Conjunctivae and EOM are normal. Right eye exhibits no discharge. Left eye exhibits no discharge.   Neck: No thyromegaly present.   Cardiovascular: Normal rate, regular rhythm, normal heart sounds and intact distal pulses.    No murmur heard.  1 + edema, no cellulitis   Pulmonary/Chest: Effort normal and breath sounds normal.   Musculoskeletal: He exhibits no edema.   Lymphadenopathy:     He has no cervical adenopathy.   Neurological: He is alert and oriented to person, place, and time.   Skin: Skin is warm and dry.   Psychiatric: He has a " normal mood and affect.   Nursing note and vitals reviewed.      Assessment/Plan   Problem List Items Addressed This Visit        Hematopoietic and Hemostatic    Anemia    Relevant Orders    Ambulatory Referral to Hematology / Oncology       Other    Cellulitis of left lower extremity - Primary      Other Visit Diagnoses     Need for immunization against influenza        Relevant Orders    Flu Vaccine High Dose PF 65YR+ ([id number]) (Completed)          Continue current treatment. Current outpatient and discharge medications have been reconciled for the patient.  Reviewed by: Poonam Peña MD     No orders of the defined types were placed in this encounter.    Return for Next scheduled follow up.

## 2018-10-24 ENCOUNTER — APPOINTMENT (OUTPATIENT)
Dept: ONCOLOGY | Facility: HOSPITAL | Age: 83
End: 2018-10-24

## (undated) DEVICE — SINGLE-USE BIOPSY FORCEPS: Brand: RADIAL JAW 4

## (undated) DEVICE — ELECTRODE,RT,STRESS,FOAM,50PK: Brand: MEDLINE

## (undated) DEVICE — INTRO SHEATH ART/FEM ENGAGE .038 6F12CM

## (undated) DEVICE — ST CVR PROB PULLUP ULTRASND 5X48IN

## (undated) DEVICE — MODEL BT2000 P/N 700287-012KIT CONTENTS: MANIFOLD WITH SALINE AND CONTRAST PORTS, SALINE TUBING WITH SPIKE AND HAND SYRINGE, TRANSDUCER: Brand: BT2000 AUTOMATED MANIFOLD KIT

## (undated) DEVICE — MODEL AT P65, P/N 701554-001KIT CONTENTS: HAND CONTROLLER, 3-WAY HIGH-PRESSURE STOPCOCK WITH ROTATING END AND PREMIUM HIGH-PRESSURE TUBING: Brand: ANGIOTOUCH® KIT

## (undated) DEVICE — GW PERIPH GUIDERIGHT STD/J/TP PTFE/PCOAT SS 0.038IN 5X150CM

## (undated) DEVICE — PK CATH LAB 60

## (undated) DEVICE — A2000 MULTI-USE SYRINGE KIT, P/N 701277-003KIT CONTENTS: 100ML CONTRAST RESERVOIR AND TUBING WITH CONTRAST SPIKE AND CLAMP: Brand: A2000 MULTI-USE SYRINGE KIT

## (undated) DEVICE — ARTERIAL NEEDLE: Brand: UNBRANDED

## (undated) DEVICE — CATH DIAG EXPO M/ PK 6FR FL4/FR4 PIG 3PK